# Patient Record
Sex: FEMALE | Race: WHITE | NOT HISPANIC OR LATINO | Employment: FULL TIME | URBAN - METROPOLITAN AREA
[De-identification: names, ages, dates, MRNs, and addresses within clinical notes are randomized per-mention and may not be internally consistent; named-entity substitution may affect disease eponyms.]

---

## 2017-03-01 ENCOUNTER — TRANSCRIBE ORDERS (OUTPATIENT)
Dept: OBSTETRICS AND GYNECOLOGY | Facility: CLINIC | Age: 32
End: 2017-03-01

## 2019-08-22 ENCOUNTER — HOSPITAL ENCOUNTER (EMERGENCY)
Facility: HOSPITAL | Age: 34
Discharge: HOME | End: 2019-08-22
Attending: EMERGENCY MEDICINE | Admitting: EMERGENCY MEDICINE

## 2019-08-22 ENCOUNTER — APPOINTMENT (OUTPATIENT)
Dept: GENERAL RADIOLOGY | Facility: HOSPITAL | Age: 34
End: 2019-08-22

## 2019-08-22 VITALS
SYSTOLIC BLOOD PRESSURE: 112 MMHG | TEMPERATURE: 98.3 F | DIASTOLIC BLOOD PRESSURE: 73 MMHG | RESPIRATION RATE: 16 BRPM | HEIGHT: 66 IN | HEART RATE: 65 BPM | WEIGHT: 150 LBS | BODY MASS INDEX: 24.11 KG/M2 | OXYGEN SATURATION: 97 %

## 2019-08-22 DIAGNOSIS — R09.1 PLEURISY: Primary | ICD-10-CM

## 2019-08-22 LAB
ALBUMIN SERPL-MCNC: 4.6 G/DL (ref 3.5–5.2)
ALBUMIN/GLOB SERPL: 1.8 G/DL
ALP SERPL-CCNC: 60 U/L (ref 39–117)
ALT SERPL W P-5'-P-CCNC: 18 U/L (ref 1–33)
ANION GAP SERPL CALCULATED.3IONS-SCNC: 14.2 MMOL/L (ref 5–15)
AST SERPL-CCNC: 17 U/L (ref 1–32)
BASOPHILS # BLD AUTO: 0.02 10*3/MM3 (ref 0–0.2)
BASOPHILS NFR BLD AUTO: 0.3 % (ref 0–1.5)
BILIRUB SERPL-MCNC: 0.3 MG/DL (ref 0.2–1.2)
BUN BLD-MCNC: 11 MG/DL (ref 6–20)
BUN/CREAT SERPL: 14.7 (ref 7–25)
CALCIUM SPEC-SCNC: 9.7 MG/DL (ref 8.6–10.5)
CHLORIDE SERPL-SCNC: 103 MMOL/L (ref 98–107)
CO2 SERPL-SCNC: 24.8 MMOL/L (ref 22–29)
CREAT BLD-MCNC: 0.75 MG/DL (ref 0.57–1)
D DIMER PPP FEU-MCNC: 0.28 MCGFEU/ML (ref 0–0.46)
DEPRECATED RDW RBC AUTO: 41.5 FL (ref 37–54)
EOSINOPHIL # BLD AUTO: 0.1 10*3/MM3 (ref 0–0.4)
EOSINOPHIL NFR BLD AUTO: 1.6 % (ref 0.3–6.2)
ERYTHROCYTE [DISTWIDTH] IN BLOOD BY AUTOMATED COUNT: 11.9 % (ref 12.3–15.4)
GFR SERPL CREATININE-BSD FRML MDRD: 88 ML/MIN/1.73
GLOBULIN UR ELPH-MCNC: 2.5 GM/DL
GLUCOSE BLD-MCNC: 126 MG/DL (ref 65–99)
HCG SERPL QL: NEGATIVE
HCT VFR BLD AUTO: 42.7 % (ref 34–46.6)
HGB BLD-MCNC: 14.9 G/DL (ref 12–15.9)
IMM GRANULOCYTES # BLD AUTO: 0.01 10*3/MM3 (ref 0–0.05)
IMM GRANULOCYTES NFR BLD AUTO: 0.2 % (ref 0–0.5)
LYMPHOCYTES # BLD AUTO: 1.6 10*3/MM3 (ref 0.7–3.1)
LYMPHOCYTES NFR BLD AUTO: 25.8 % (ref 19.6–45.3)
MCH RBC QN AUTO: 33 PG (ref 26.6–33)
MCHC RBC AUTO-ENTMCNC: 34.9 G/DL (ref 31.5–35.7)
MCV RBC AUTO: 94.7 FL (ref 79–97)
MONOCYTES # BLD AUTO: 0.44 10*3/MM3 (ref 0.1–0.9)
MONOCYTES NFR BLD AUTO: 7.1 % (ref 5–12)
NEUTROPHILS # BLD AUTO: 4.03 10*3/MM3 (ref 1.7–7)
NEUTROPHILS NFR BLD AUTO: 65 % (ref 42.7–76)
NRBC BLD AUTO-RTO: 0 /100 WBC (ref 0–0.2)
PLATELET # BLD AUTO: 219 10*3/MM3 (ref 140–450)
PMV BLD AUTO: 9.9 FL (ref 6–12)
POTASSIUM BLD-SCNC: 3.7 MMOL/L (ref 3.5–5.2)
PROT SERPL-MCNC: 7.1 G/DL (ref 6–8.5)
RBC # BLD AUTO: 4.51 10*6/MM3 (ref 3.77–5.28)
SODIUM BLD-SCNC: 142 MMOL/L (ref 136–145)
TROPONIN T SERPL-MCNC: <0.01 NG/ML (ref 0–0.03)
WBC NRBC COR # BLD: 6.2 10*3/MM3 (ref 3.4–10.8)

## 2019-08-22 PROCEDURE — 93005 ELECTROCARDIOGRAM TRACING: CPT | Performed by: EMERGENCY MEDICINE

## 2019-08-22 PROCEDURE — 85025 COMPLETE CBC W/AUTO DIFF WBC: CPT | Performed by: EMERGENCY MEDICINE

## 2019-08-22 PROCEDURE — 99284 EMERGENCY DEPT VISIT MOD MDM: CPT | Performed by: EMERGENCY MEDICINE

## 2019-08-22 PROCEDURE — 85379 FIBRIN DEGRADATION QUANT: CPT | Performed by: EMERGENCY MEDICINE

## 2019-08-22 PROCEDURE — 80053 COMPREHEN METABOLIC PANEL: CPT | Performed by: EMERGENCY MEDICINE

## 2019-08-22 PROCEDURE — 99284 EMERGENCY DEPT VISIT MOD MDM: CPT

## 2019-08-22 PROCEDURE — 84703 CHORIONIC GONADOTROPIN ASSAY: CPT | Performed by: EMERGENCY MEDICINE

## 2019-08-22 PROCEDURE — 25010000002 KETOROLAC TROMETHAMINE PER 15 MG: Performed by: EMERGENCY MEDICINE

## 2019-08-22 PROCEDURE — 96374 THER/PROPH/DIAG INJ IV PUSH: CPT

## 2019-08-22 PROCEDURE — 93010 ELECTROCARDIOGRAM REPORT: CPT | Performed by: INTERNAL MEDICINE

## 2019-08-22 PROCEDURE — 71046 X-RAY EXAM CHEST 2 VIEWS: CPT

## 2019-08-22 PROCEDURE — 84484 ASSAY OF TROPONIN QUANT: CPT | Performed by: EMERGENCY MEDICINE

## 2019-08-22 RX ORDER — SODIUM CHLORIDE 0.9 % (FLUSH) 0.9 %
10 SYRINGE (ML) INJECTION AS NEEDED
Status: DISCONTINUED | OUTPATIENT
Start: 2019-08-22 | End: 2019-08-22 | Stop reason: HOSPADM

## 2019-08-22 RX ORDER — DICLOFENAC SODIUM 75 MG/1
75 TABLET, DELAYED RELEASE ORAL 2 TIMES DAILY PRN
Qty: 20 TABLET | Refills: 0 | Status: SHIPPED | OUTPATIENT
Start: 2019-08-22 | End: 2019-12-17

## 2019-08-22 RX ORDER — KETOROLAC TROMETHAMINE 30 MG/ML
30 INJECTION, SOLUTION INTRAMUSCULAR; INTRAVENOUS ONCE
Status: COMPLETED | OUTPATIENT
Start: 2019-08-22 | End: 2019-08-22

## 2019-08-22 RX ADMIN — KETOROLAC TROMETHAMINE 30 MG: 30 INJECTION, SOLUTION INTRAMUSCULAR at 15:07

## 2019-08-22 NOTE — ED PROVIDER NOTES
Subjective     History provided by:  Patient    History of Present Illness    · Chief complaint: Chest pain    · Location: Substernally nonradiating    · Quality/Severity: Sharp fleeting intermittent episodes of chest pain.  Episodes last for seconds at a time and come and go.    · Timing/Onset: Started 2 days ago.    · Modifying Factors: She is unaware of any causative factors as the pain occurs at rest as well as with activity.  She was even awoken from sleep with it last night.  She does state that taking a deep breath exacerbates the pain.    · Associated symptoms: She denies associated shortness of breath, palpitations, or pain in her extremities or neck.    · Narrative: The patient is a 34-year-old white female complaining of a 2-day history of intermittent chest pain.  The pain is sharp and fleeting with episodes lasting a second at a time and coming and going.  Taking a deep breath exacerbates the pain.  Her past medical history is negative.  The patient works from home and smokes 1 pack/day.  Her last menstrual period was last week.  She is not on birth control.    Review of Systems   Constitutional: Negative for activity change, appetite change, chills, diaphoresis, fatigue and fever.   HENT: Negative for congestion, dental problem, ear pain, hearing loss, mouth sores, postnasal drip, rhinorrhea, sinus pressure, sore throat and voice change.    Eyes: Negative for photophobia, pain, discharge, redness and visual disturbance.   Respiratory: Negative for cough, chest tightness, shortness of breath, wheezing and stridor.    Cardiovascular: Positive for chest pain. Negative for palpitations and leg swelling.   Gastrointestinal: Negative for abdominal pain, diarrhea, nausea and vomiting.   Genitourinary: Negative for difficulty urinating, dysuria, flank pain, frequency, hematuria, pelvic pain, urgency and vaginal bleeding.   Musculoskeletal: Negative for arthralgias, back pain, gait problem, joint swelling,  "myalgias, neck pain and neck stiffness.   Skin: Negative for color change and rash.   Neurological: Negative for dizziness, tremors, seizures, syncope, facial asymmetry, speech difficulty, weakness, light-headedness, numbness and headaches.   Hematological: Negative for adenopathy.   Psychiatric/Behavioral: Negative.  Negative for confusion and decreased concentration. The patient is not nervous/anxious.      Past Medical History:   Diagnosis Date   • Chronic neck pain      /73   Pulse 65   Temp 98.3 °F (36.8 °C) (Oral)   Resp 16   Ht 167.6 cm (66\")   Wt 68 kg (150 lb)   LMP 08/17/2019 (Exact Date)   SpO2 97%   BMI 24.21 kg/m²     Past Medical History:   Diagnosis Date   • Chronic neck pain        Allergies   Allergen Reactions   • Sulfa Antibiotics        Past Surgical History:   Procedure Laterality Date   • CHOLECYSTECTOMY         Family History   Problem Relation Age of Onset   • Cancer Mother    • Diabetes Father    • No Known Problems Sister    • No Known Problems Brother    • No Known Problems Son    • No Known Problems Daughter    • No Known Problems Maternal Grandmother    • No Known Problems Maternal Grandfather    • No Known Problems Paternal Grandmother    • No Known Problems Paternal Grandfather    • No Known Problems Cousin        Social History     Socioeconomic History   • Marital status:      Spouse name: Not on file   • Number of children: Not on file   • Years of education: Not on file   • Highest education level: Not on file   Tobacco Use   • Smoking status: Current Every Day Smoker     Packs/day: 1.00   Substance and Sexual Activity   • Alcohol use: Yes     Comment: rare   • Drug use: No           Objective   Physical Exam   Constitutional: She is oriented to person, place, and time. She appears well-developed and well-nourished. No distress.   The patient appears healthy in no acute distress.  Review of her vital signs: She is afebrile and all her vital signs are within " normal limits.   HENT:   Head: Normocephalic and atraumatic.   Nose: Nose normal.   Mouth/Throat: Oropharynx is clear and moist. No oropharyngeal exudate.   Eyes: EOM are normal. Pupils are equal, round, and reactive to light. Right eye exhibits no discharge. Left eye exhibits no discharge. No scleral icterus.   Neck: Normal range of motion. Neck supple. No JVD present. No thyromegaly present.   Cardiovascular: Normal rate, regular rhythm and normal heart sounds.   No murmur heard.  Pulmonary/Chest: Effort normal and breath sounds normal. She has no wheezes. She has no rales. She exhibits no tenderness.   Abdominal: Soft. Bowel sounds are normal. She exhibits no distension. There is no tenderness.   Musculoskeletal: Normal range of motion. She exhibits no edema, tenderness or deformity.   Lymphadenopathy:     She has no cervical adenopathy.   Neurological: She is alert and oriented to person, place, and time. No cranial nerve deficit. Coordination normal.   No focal motor sensory deficit   Skin: Skin is warm and dry. Capillary refill takes less than 2 seconds. No rash noted. She is not diaphoretic.   Psychiatric: She has a normal mood and affect. Her behavior is normal. Judgment and thought content normal.   Nursing note and vitals reviewed.      Procedures           ED Course  ED Course as of Aug 22 1732   Thu Aug 22, 2019   1433 My interpretation the patient's EKG tracing performed at 13: 44 is normal sinus rhythm with a rate of 89, slight right axis deviation, no acute ST segment elevation or depression consistent with ischemia, no ectopy, normal TN and QT intervals, essentially a normal EKG.  [TP]   1600 Review of the patient's test results: Her chest x-ray was interpreted by me and the radiologist as normal.  Her CMP was essentially normal.  CBC normal.  Cardiac troponin normal.  Beta hCG negative.  D-dimer negative.  [TP]   1600 Is my impression the patient has noncardiac chest pain likely due to pleurisy.   She will be counseled to stop smoking.  I will place her on an anti-inflammatory medication such as Voltaren.  I counseled her to stop smoking.  She is to follow-up with her PCP in 1 week if not improved.  [TP]      ED Course User Index  [TP] Villa Bonner MD                  MDM  Number of Diagnoses or Management Options  Pleurisy: new and requires workup     Amount and/or Complexity of Data Reviewed  Clinical lab tests: ordered and reviewed  Tests in the radiology section of CPT®: ordered and reviewed  Tests in the medicine section of CPT®: ordered and reviewed  Independent visualization of images, tracings, or specimens: yes    Risk of Complications, Morbidity, and/or Mortality  Presenting problems: high  Diagnostic procedures: high  Management options: high  General comments: My differential diagnosis for chest pain includes but is not limited to:  Muscle strain, costochondritis, myositis, pleurisy, rib fracture, intercostal neuritis, herpes zoster, tumor, myocardial infarction, coronary syndrome, unstable angina, angina, aortic dissection, mitral valve prolapse, pericarditis, palpitations, pulmonary embolus, pneumonia, pneumothorax, lung cancer, GERD, esophagitis, esophageal spasm    Patient Progress  Patient progress: stable        Final diagnoses:   Pleurisy           Labs Reviewed   COMPREHENSIVE METABOLIC PANEL - Abnormal; Notable for the following components:       Result Value    Glucose 126 (*)     All other components within normal limits    Narrative:     GFR Normal >60  Chronic Kidney Disease <60  Kidney Failure <15   CBC WITH AUTO DIFFERENTIAL - Abnormal; Notable for the following components:    RDW 11.9 (*)     All other components within normal limits   HCG, SERUM, QUALITATIVE - Normal   TROPONIN (IN-HOUSE) - Normal    Narrative:     Troponin T Reference Range:  <= 0.03 ng/mL-   Negative for AMI  >0.03 ng/mL-     Abnormal for myocardial necrosis.  Clinicians would have to utilize clinical  acumen, EKG, Troponin and serial changes to determine if it is an Acute Myocardial Infarction or myocardial injury due to an underlying chronic condition.    D-DIMER, QUANTITATIVE - Normal    Narrative:     Can be elevated in, but is not diagnostic for deep vein thrombosis (DVT) or pulmonary embolis (PE).  It is also elevated in other medical conditions.  Clinical correlation is required.  The negative cut-off value for the D-Dimer is 0.50 mcg FEU/mL for DVT and PE.   CBC AND DIFFERENTIAL    Narrative:     The following orders were created for panel order CBC & Differential.  Procedure                               Abnormality         Status                     ---------                               -----------         ------                     CBC Auto Differential[[id number]]         Abnormal            Final result                 Please view results for these tests on the individual orders.     XR Chest 2 View   ED Interpretation   Negative chest.       This report was finalized on 8/22/2019 3:47 PM by Dr. Dudley Cervantes MD.          Final Result   Negative chest.       This report was finalized on 8/22/2019 3:47 PM by Dr. Dudley Cervantes MD.                 Medication List      New Prescriptions    diclofenac 75 MG EC tablet  Commonly known as:  VOLTAREN  Take 1 tablet by mouth 2 (Two) Times a Day As Needed (Pain) for up to 20   doses.               Villa Bonner MD  08/22/19 [id number]

## 2019-09-17 ENCOUNTER — OFFICE VISIT (OUTPATIENT)
Dept: OBSTETRICS AND GYNECOLOGY | Facility: CLINIC | Age: 34
End: 2019-09-17

## 2019-09-17 VITALS
BODY MASS INDEX: 23.95 KG/M2 | HEIGHT: 66 IN | DIASTOLIC BLOOD PRESSURE: 68 MMHG | WEIGHT: 149 LBS | SYSTOLIC BLOOD PRESSURE: 110 MMHG

## 2019-09-17 DIAGNOSIS — Z01.419 PAP SMEAR, LOW-RISK: ICD-10-CM

## 2019-09-17 DIAGNOSIS — Z13.9 SCREENING FOR CONDITION: ICD-10-CM

## 2019-09-17 DIAGNOSIS — Z01.419 ENCOUNTER FOR GYNECOLOGICAL EXAMINATION WITHOUT ABNORMAL FINDING: Primary | ICD-10-CM

## 2019-09-17 DIAGNOSIS — Z11.51 SPECIAL SCREENING EXAMINATION FOR HUMAN PAPILLOMAVIRUS (HPV): ICD-10-CM

## 2019-09-17 LAB
B-HCG UR QL: NEGATIVE
BILIRUB BLD-MCNC: NEGATIVE MG/DL
CLARITY, POC: CLEAR
COLOR UR: YELLOW
GLUCOSE UR STRIP-MCNC: NEGATIVE MG/DL
INTERNAL NEGATIVE CONTROL: NEGATIVE
INTERNAL POSITIVE CONTROL: POSITIVE
KETONES UR QL: NEGATIVE
LEUKOCYTE EST, POC: NEGATIVE
Lab: NORMAL
NITRITE UR-MCNC: NEGATIVE MG/ML
PH UR: 6 [PH] (ref 5–8)
PROT UR STRIP-MCNC: NEGATIVE MG/DL
RBC # UR STRIP: ABNORMAL /UL
SP GR UR: 1 (ref 1–1.03)
UROBILINOGEN UR QL: NORMAL

## 2019-09-17 PROCEDURE — 81025 URINE PREGNANCY TEST: CPT | Performed by: OBSTETRICS & GYNECOLOGY

## 2019-09-17 PROCEDURE — 99385 PREV VISIT NEW AGE 18-39: CPT | Performed by: OBSTETRICS & GYNECOLOGY

## 2019-09-17 PROCEDURE — 81002 URINALYSIS NONAUTO W/O SCOPE: CPT | Performed by: OBSTETRICS & GYNECOLOGY

## 2019-09-17 NOTE — PROGRESS NOTES
GYN Annual Exam     CC- Here for annual exam.     Namita Gabriel is a 34 y.o. female who presents for annual well woman exam. Periods are regular every 28-30 days, lasting 5 days. Dysmenorrhea:none. Cyclic symptoms include none. No intermenstrual bleeding, spotting, or discharge.    OB History      Para Term  AB Living    2 2 2     2    SAB TAB Ectopic Molar Multiple Live Births              2          Current contraception: none  History of abnormal Pap smear: no  Family history of uterine, colon or ovarian cancer: yes - Dad colon cancer  History of abnormal mammogram: no  Family history of breast cancer: yes - mom  Last Pap :     Past Medical History:   Diagnosis Date   • Chronic neck pain        Past Surgical History:   Procedure Laterality Date   • CHOLECYSTECTOMY           Current Outpatient Medications:   •  acetaminophen (TYLENOL) 500 MG tablet, Take 1,000 mg by mouth Every 6 (Six) Hours As Needed for mild pain (1-3) or moderate pain (4-6)., Disp: , Rfl:   •  diclofenac (VOLTAREN) 75 MG EC tablet, Take 1 tablet by mouth 2 (Two) Times a Day As Needed (Pain) for up to 20 doses., Disp: 20 tablet, Rfl: 0  •  ibuprofen (ADVIL,MOTRIN) 400 MG tablet, Take 400 mg by mouth Every 6 (Six) Hours As Needed for mild pain (1-3) or moderate pain (4-6)., Disp: , Rfl:   •  neomycin-polymyxin-dexamethasone (MAXITROL) 0.1 % ophthalmic suspension, Administer 1 drop to both eyes 4 (four) times a day., Disp: 5 mL, Rfl: 0  •  neomycin-polymyxin-hydrocortisone (CORTISPORIN) 3.5-79350-5 otic solution, Administer 3 drops into the left ear 4 (Four) Times a Day., Disp: 10 mL, Rfl: 0    Allergies   Allergen Reactions   • Sulfa Antibiotics        Social History     Tobacco Use   • Smoking status: Current Every Day Smoker     Packs/day: 1.00   • Smokeless tobacco: Never Used   Substance Use Topics   • Alcohol use: Yes     Comment: rare   • Drug use: No       Family History   Problem Relation Age of Onset   • Cancer Mother   "  • Diabetes Father    • Colon cancer Father    • No Known Problems Sister    • No Known Problems Brother    • No Known Problems Son    • No Known Problems Daughter    • No Known Problems Maternal Grandmother    • No Known Problems Maternal Grandfather    • No Known Problems Paternal Grandmother    • No Known Problems Paternal Grandfather    • No Known Problems Cousin        Review of Systems   Constitutional: Negative for appetite change, fever and unexpected weight change.   HENT: Negative for congestion and sore throat.    Respiratory: Negative for cough and shortness of breath.    Cardiovascular: Negative for chest pain and palpitations.   Gastrointestinal: Negative for abdominal distention, abdominal pain, constipation, diarrhea, nausea and vomiting.   Endocrine: Negative.    Genitourinary: Negative for dyspareunia, menstrual problem, pelvic pain and vaginal discharge.   Skin: Negative.    Neurological: Negative for dizziness and syncope.   Hematological: Negative.    Psychiatric/Behavioral: Negative for dysphoric mood and sleep disturbance. The patient is not nervous/anxious.        /68   Ht 167.6 cm (66\")   Wt 67.6 kg (149 lb)   LMP 09/11/2019   BMI 24.05 kg/m²     Physical Exam   Constitutional: She is oriented to person, place, and time. She appears well-developed and well-nourished.   HENT:   Head: Normocephalic and atraumatic.   Neck: Normal range of motion. Neck supple. No thyromegaly present.   Cardiovascular: Normal rate and regular rhythm.   Pulmonary/Chest: Effort normal and breath sounds normal. Right breast exhibits no mass and no nipple discharge. Left breast exhibits no mass and no nipple discharge. Breasts are symmetrical. There is no breast swelling.   Abdominal: Soft. Bowel sounds are normal. She exhibits no distension and no mass. There is no tenderness. There is no rebound and no guarding.   Genitourinary: Vagina normal and uterus normal. No breast tenderness, discharge or " bleeding. Pelvic exam was performed with patient prone. There is no lesion on the right labia. There is no lesion on the left labia. Cervix exhibits no motion tenderness and no discharge. Right adnexum displays no mass. Left adnexum displays no mass.   Musculoskeletal: Normal range of motion. She exhibits no edema.   Neurological: She is alert and oriented to person, place, and time.   Skin: Skin is warm and dry.   Psychiatric: She has a normal mood and affect. Her behavior is normal. Judgment and thought content normal.   Nursing note and vitals reviewed.      Diagnoses and all orders for this visit:    Pap smear, low-risk  -     Pap IG, HPV-hr    Screening for condition  -     POC Urinalysis Dipstick  -     POC Pregnancy, Urine    Special screening examination for human papillomavirus (HPV)  -     Pap IG, HPV-hr        Assessment     1) GYN annual well woman exam.   2) MMG - Hx of mom  with breast cancer at 50     Plan     1) Breast Health - Clinical breast exam & mammogram yearly, Self breast awareness monthly  2) Pap - done today  3) Smoking status- Current every day smoker 3-10 mintues spent counseling Will try to cut down  4) Colon health - screening colonoscopy recommended if not up to date  5) Bone health - Weight bearing exercise, dietary calcium recommendations and vitamin D reviewed.   6) Seat belts recommended  7) Follow up prn and one year    Encounter Diagnoses   Name Primary?   • Pap smear, low-risk Yes   • Screening for condition    • Special screening examination for human papillomavirus (HPV)          Juan Kimbrough MD  2019  11:01 AM

## 2019-09-20 LAB
CYTOLOGIST CVX/VAG CYTO: ABNORMAL
CYTOLOGY CVX/VAG DOC CYTO: ABNORMAL
CYTOLOGY CVX/VAG DOC THIN PREP: ABNORMAL
DX ICD CODE: ABNORMAL
DX ICD CODE: ABNORMAL
HIV 1 & 2 AB SER-IMP: ABNORMAL
HPV I/H RISK 1 DNA CVX QL PROBE+SIG AMP: NEGATIVE
OTHER STN SPEC: ABNORMAL
PATHOLOGIST CVX/VAG CYTO: ABNORMAL
RECOM F/U CVX/VAG CYTO: ABNORMAL
STAT OF ADQ CVX/VAG CYTO-IMP: ABNORMAL

## 2019-09-25 ENCOUNTER — HOSPITAL ENCOUNTER (OUTPATIENT)
Dept: MAMMOGRAPHY | Facility: HOSPITAL | Age: 34
Discharge: HOME | End: 2019-09-25
Admitting: OBSTETRICS & GYNECOLOGY

## 2019-09-25 DIAGNOSIS — Z13.9 SCREENING FOR CONDITION: ICD-10-CM

## 2019-09-25 PROCEDURE — 77067 SCR MAMMO BI INCL CAD: CPT

## 2019-09-25 PROCEDURE — 77063 BREAST TOMOSYNTHESIS BI: CPT

## 2019-09-26 ENCOUNTER — TELEPHONE (OUTPATIENT)
Dept: OBSTETRICS AND GYNECOLOGY | Facility: CLINIC | Age: 34
End: 2019-09-26

## 2019-09-30 ENCOUNTER — TRANSCRIBE ORDERS (OUTPATIENT)
Dept: ADMINISTRATIVE | Facility: HOSPITAL | Age: 34
End: 2019-09-30

## 2019-09-30 DIAGNOSIS — M54.2 CERVICALGIA: Primary | ICD-10-CM

## 2019-10-02 NOTE — TELEPHONE ENCOUNTER
Patient aware and states she will go ahead and schedule and check with insurance company. But will have to call back to schedule because she did not have her calendar with her.

## 2019-10-04 ENCOUNTER — HOSPITAL ENCOUNTER (OUTPATIENT)
Dept: MRI IMAGING | Facility: HOSPITAL | Age: 34
Discharge: HOME | End: 2019-10-04

## 2019-10-04 DIAGNOSIS — M54.2 CERVICALGIA: ICD-10-CM

## 2019-12-17 ENCOUNTER — OFFICE VISIT (OUTPATIENT)
Dept: OBSTETRICS AND GYNECOLOGY | Facility: CLINIC | Age: 34
End: 2019-12-17

## 2019-12-17 VITALS
HEIGHT: 66 IN | SYSTOLIC BLOOD PRESSURE: 110 MMHG | BODY MASS INDEX: 24.3 KG/M2 | DIASTOLIC BLOOD PRESSURE: 70 MMHG | WEIGHT: 151.2 LBS

## 2019-12-17 DIAGNOSIS — N93.8 DUB (DYSFUNCTIONAL UTERINE BLEEDING): Primary | ICD-10-CM

## 2019-12-17 DIAGNOSIS — Z13.9 SCREENING FOR CONDITION: ICD-10-CM

## 2019-12-17 LAB
B-HCG UR QL: NEGATIVE
BASOPHILS # BLD AUTO: 0.06 10*3/MM3 (ref 0–0.2)
BASOPHILS NFR BLD AUTO: 0.8 % (ref 0–1.5)
BILIRUB BLD-MCNC: NEGATIVE MG/DL
CLARITY, POC: CLEAR
COLOR UR: YELLOW
EOSINOPHIL # BLD AUTO: 0.22 10*3/MM3 (ref 0–0.4)
EOSINOPHIL NFR BLD AUTO: 3 % (ref 0.3–6.2)
ERYTHROCYTE [DISTWIDTH] IN BLOOD BY AUTOMATED COUNT: 12.2 % (ref 12.3–15.4)
GLUCOSE UR STRIP-MCNC: NEGATIVE MG/DL
HCT VFR BLD AUTO: 44.3 % (ref 34–46.6)
HGB BLD-MCNC: 15.2 G/DL (ref 12–15.9)
IMM GRANULOCYTES # BLD AUTO: 0.02 10*3/MM3 (ref 0–0.05)
IMM GRANULOCYTES NFR BLD AUTO: 0.3 % (ref 0–0.5)
INTERNAL NEGATIVE CONTROL: NEGATIVE
INTERNAL POSITIVE CONTROL: POSITIVE
KETONES UR QL: NEGATIVE
LEUKOCYTE EST, POC: NEGATIVE
LYMPHOCYTES # BLD AUTO: 1.8 10*3/MM3 (ref 0.7–3.1)
LYMPHOCYTES NFR BLD AUTO: 24.9 % (ref 19.6–45.3)
Lab: 55
MCH RBC QN AUTO: 32.6 PG (ref 26.6–33)
MCHC RBC AUTO-ENTMCNC: 34.3 G/DL (ref 31.5–35.7)
MCV RBC AUTO: 95.1 FL (ref 79–97)
MONOCYTES # BLD AUTO: 0.53 10*3/MM3 (ref 0.1–0.9)
MONOCYTES NFR BLD AUTO: 7.3 % (ref 5–12)
NEUTROPHILS # BLD AUTO: 4.61 10*3/MM3 (ref 1.7–7)
NEUTROPHILS NFR BLD AUTO: 63.7 % (ref 42.7–76)
NITRITE UR-MCNC: NEGATIVE MG/ML
NRBC BLD AUTO-RTO: 0 /100 WBC (ref 0–0.2)
PH UR: 5 [PH] (ref 5–8)
PLATELET # BLD AUTO: 324 10*3/MM3 (ref 140–450)
PROT UR STRIP-MCNC: NEGATIVE MG/DL
RBC # BLD AUTO: 4.66 10*6/MM3 (ref 3.77–5.28)
RBC # UR STRIP: ABNORMAL /UL
SP GR UR: 1 (ref 1–1.03)
TSH SERPL DL<=0.005 MIU/L-ACNC: 1.54 UIU/ML (ref 0.27–4.2)
UROBILINOGEN UR QL: NORMAL
WBC # BLD AUTO: 7.24 10*3/MM3 (ref 3.4–10.8)

## 2019-12-17 PROCEDURE — 81002 URINALYSIS NONAUTO W/O SCOPE: CPT | Performed by: OBSTETRICS & GYNECOLOGY

## 2019-12-17 PROCEDURE — 99213 OFFICE O/P EST LOW 20 MIN: CPT | Performed by: OBSTETRICS & GYNECOLOGY

## 2019-12-17 PROCEDURE — 81025 URINE PREGNANCY TEST: CPT | Performed by: OBSTETRICS & GYNECOLOGY

## 2019-12-17 RX ORDER — HYDROCODONE BITARTRATE AND ACETAMINOPHEN 5; 325 MG/1; MG/1
1 TABLET ORAL
COMMUNITY
Start: 2019-11-22 | End: 2020-08-04

## 2019-12-17 RX ORDER — CYCLOBENZAPRINE HCL 10 MG
TABLET ORAL
Refills: 1 | COMMUNITY
Start: 2019-10-22 | End: 2020-08-04

## 2019-12-17 NOTE — PROGRESS NOTES
"      Namita Gabriel is a 34 y.o. patient who presents for follow up of   Chief Complaint   Patient presents with   • Follow-up     spotting for 7 days, not period       HPI 34-year-old multiparous patient with a new problem of dysfunctional uterine bleeding.  She had a normal cycle at the beginning of the month and then had 7 days of spotting.  She has to wear panty liner daily.  It is associated with a minimal amount of discharge.  She denies any fevers or pelvic pain.    The following portions of the patient's history were reviewed and updated as appropriate: allergies, current medications and problem list.    Review of Systems   Constitutional: Negative for appetite change, fever and unexpected weight change.   HENT: Negative for congestion and sore throat.    Respiratory: Negative for cough and shortness of breath.    Cardiovascular: Negative for chest pain and palpitations.   Gastrointestinal: Negative for abdominal distention, abdominal pain, constipation, diarrhea, nausea and vomiting.   Endocrine: Negative.    Genitourinary: Positive for menstrual problem, vaginal bleeding and vaginal discharge (scant). Negative for dyspareunia and pelvic pain.   Skin: Negative.    Neurological: Negative for dizziness and syncope.   Hematological: Negative.    Psychiatric/Behavioral: Negative for dysphoric mood and sleep disturbance. The patient is not nervous/anxious.        /70   Ht 167.6 cm (65.98\")   Wt 68.6 kg (151 lb 3.2 oz)   LMP 12/01/2019 (Approximate)   Breastfeeding No   BMI 24.42 kg/m²     Physical Exam   Constitutional: She is oriented to person, place, and time. She appears well-developed and well-nourished.   HENT:   Head: Normocephalic and atraumatic.   Pulmonary/Chest: Effort normal. No respiratory distress.   Abdominal: Soft. She exhibits no distension and no mass. There is no tenderness. There is no rebound and no guarding.   Musculoskeletal: Normal range of motion.   Neurological: She is alert " and oriented to person, place, and time.   Skin: Skin is warm and dry.   Psychiatric: She has a normal mood and affect. Her behavior is normal. Judgment and thought content normal.   Nursing note and vitals reviewed.        Assessment/Plan    Namita was seen today for follow-up.    Diagnoses and all orders for this visit:    DUB (dysfunctional uterine bleeding)  -     CBC & Differential  -     TSH Rfx On Abnormal To Free T4  -     Chlamydia trachomatis, Neisseria gonorrhoeae, Trichomonas vaginalis, PCR - Urine, Urine, Clean Catch    Screening for condition  -     POC Urinalysis Dipstick  -     POC Pregnancy, Urine    Check TSH and CBC.  Rule out cervicitis.  Check pelvic ultrasound.  Follow-up after testing.    Return for US, TV, Follow up with me.      Juan Kimbrough MD  12/17/2019  9:50 AM

## 2019-12-19 LAB
C TRACH RRNA SPEC QL NAA+PROBE: NEGATIVE
N GONORRHOEA RRNA SPEC QL NAA+PROBE: NEGATIVE
T VAGINALIS DNA SPEC QL NAA+PROBE: NEGATIVE

## 2020-01-02 ENCOUNTER — PROCEDURE VISIT (OUTPATIENT)
Dept: OBSTETRICS AND GYNECOLOGY | Facility: CLINIC | Age: 35
End: 2020-01-02

## 2020-01-02 ENCOUNTER — OFFICE VISIT (OUTPATIENT)
Dept: OBSTETRICS AND GYNECOLOGY | Facility: CLINIC | Age: 35
End: 2020-01-02

## 2020-01-02 VITALS
HEIGHT: 66 IN | WEIGHT: 154 LBS | DIASTOLIC BLOOD PRESSURE: 70 MMHG | BODY MASS INDEX: 24.75 KG/M2 | SYSTOLIC BLOOD PRESSURE: 110 MMHG

## 2020-01-02 DIAGNOSIS — N93.8 DUB (DYSFUNCTIONAL UTERINE BLEEDING): Primary | ICD-10-CM

## 2020-01-02 DIAGNOSIS — N83.201 CYST OF RIGHT OVARY: ICD-10-CM

## 2020-01-02 PROCEDURE — 99213 OFFICE O/P EST LOW 20 MIN: CPT | Performed by: OBSTETRICS & GYNECOLOGY

## 2020-01-02 PROCEDURE — 76830 TRANSVAGINAL US NON-OB: CPT | Performed by: OBSTETRICS & GYNECOLOGY

## 2020-01-02 RX ORDER — AMOXICILLIN 500 MG/1
CAPSULE ORAL
COMMUNITY
Start: 2019-12-26 | End: 2020-08-04

## 2020-01-02 NOTE — PROGRESS NOTES
"      Namita Gabriel is a 34 y.o. patient who presents for follow up of   Chief Complaint   Patient presents with   • Follow-up       HPI 34-year-old female with an episode of dysfunctional uterine bleeding.  It stopped a few days after I saw her last.  She had labs drawn and it pelvic ultrasound today.  Her symptoms have not returned.    The following portions of the patient's history were reviewed and updated as appropriate: allergies, current medications and problem list.    Review of Systems   Constitutional: Negative for appetite change, fever and unexpected weight change.   HENT: Negative for congestion and sore throat.    Respiratory: Negative for cough and shortness of breath.    Cardiovascular: Negative for chest pain and palpitations.   Gastrointestinal: Negative for abdominal distention, abdominal pain, constipation, diarrhea, nausea and vomiting.   Endocrine: Negative.    Genitourinary: Negative for dyspareunia, menstrual problem, pelvic pain and vaginal discharge.   Skin: Negative.    Neurological: Negative for dizziness and syncope.   Hematological: Negative.    Psychiatric/Behavioral: Negative for dysphoric mood and sleep disturbance. The patient is not nervous/anxious.        /70   Ht 167.6 cm (65.98\")   Wt 69.9 kg (154 lb)   LMP 12/01/2019 (Exact Date)   Breastfeeding No   BMI 24.87 kg/m²     Physical Exam   Constitutional: She is oriented to person, place, and time. She appears well-developed and well-nourished.   HENT:   Head: Normocephalic and atraumatic.   Pulmonary/Chest: Effort normal. No respiratory distress.   Abdominal: Soft. She exhibits no distension and no mass. There is no tenderness. There is no rebound and no guarding.   Musculoskeletal: Normal range of motion.   Neurological: She is alert and oriented to person, place, and time.   Skin: Skin is warm and dry.   Psychiatric: She has a normal mood and affect. Her behavior is normal. Judgment and thought content normal. "   Nursing note and vitals reviewed.    TSH and CBC normal.  Infection studies negative.  Reviewed ultrasound with the patient.  Uterus is anteverted with smooth borders and contours.  Endometrial thickness is normal for a premenopausal female.  Follicular cyst noted.  Small amount of fluid in the cul-de-sac.  No acute pathology.    Assessment/Plan    Namita was seen today for follow-up.    Diagnoses and all orders for this visit:    DUB (dysfunctional uterine bleeding)    Labs and ultrasound essentially normal.  Symptoms have resolved.  Follow-up as needed.    Return if symptoms worsen or fail to improve.      Juan Kimbrough MD  1/2/2020  1:38 PM

## 2020-08-04 ENCOUNTER — OFFICE VISIT (OUTPATIENT)
Dept: OBSTETRICS AND GYNECOLOGY | Facility: CLINIC | Age: 35
End: 2020-08-04

## 2020-08-04 VITALS
DIASTOLIC BLOOD PRESSURE: 60 MMHG | BODY MASS INDEX: 27.49 KG/M2 | HEIGHT: 65 IN | SYSTOLIC BLOOD PRESSURE: 110 MMHG | WEIGHT: 165 LBS

## 2020-08-04 DIAGNOSIS — Z01.419 ROUTINE GYNECOLOGICAL EXAMINATION: Primary | ICD-10-CM

## 2020-08-04 DIAGNOSIS — Z11.51 SPECIAL SCREENING EXAMINATION FOR HUMAN PAPILLOMAVIRUS (HPV): ICD-10-CM

## 2020-08-04 DIAGNOSIS — N63.0 BREAST MASS IN FEMALE: ICD-10-CM

## 2020-08-04 DIAGNOSIS — Z01.419 PAP SMEAR, LOW-RISK: ICD-10-CM

## 2020-08-04 LAB
B-HCG UR QL: NEGATIVE
BILIRUB BLD-MCNC: NEGATIVE MG/DL
CLARITY, POC: CLEAR
COLOR UR: YELLOW
GLUCOSE UR STRIP-MCNC: NEGATIVE MG/DL
INTERNAL NEGATIVE CONTROL: NEGATIVE
INTERNAL POSITIVE CONTROL: POSITIVE
KETONES UR QL: NEGATIVE
LEUKOCYTE EST, POC: NEGATIVE
Lab: NORMAL
NITRITE UR-MCNC: NEGATIVE MG/ML
PH UR: 5 [PH] (ref 5–8)
PROT UR STRIP-MCNC: NEGATIVE MG/DL
RBC # UR STRIP: NEGATIVE /UL
SP GR UR: 1 (ref 1–1.03)
UROBILINOGEN UR QL: NORMAL

## 2020-08-04 PROCEDURE — 99395 PREV VISIT EST AGE 18-39: CPT | Performed by: OBSTETRICS & GYNECOLOGY

## 2020-08-04 PROCEDURE — 81025 URINE PREGNANCY TEST: CPT | Performed by: OBSTETRICS & GYNECOLOGY

## 2020-08-04 PROCEDURE — 81002 URINALYSIS NONAUTO W/O SCOPE: CPT | Performed by: OBSTETRICS & GYNECOLOGY

## 2020-08-04 NOTE — PROGRESS NOTES
GYN Annual Exam     CC- Here for annual exam.     Namita Gabriel is a 35 y.o. female who presents for annual well woman exam. Periods are regular every 28-30 days, lasting 5 days. Dysmenorrhea:none. Cyclic symptoms include none. No intermenstrual bleeding, spotting, or discharge.  Patient had a episode of 4 days of severe left breast pain in the outer quadrant with a small amount of white nipple discharge.  The pain was quite severe for 4 days but is gone currently.  She had a mammogram last year because she has a significant history of her young mother being diagnosed with breast cancer as well.  This is a new problem for her.    OB History        2    Para   2    Term   2            AB        Living   2       SAB        TAB        Ectopic        Molar        Multiple        Live Births   2                Current contraception: none  History of abnormal Pap smear: yes - ASCUS negative HPV last year  Family history of uterine, colon or ovarian cancer: no  History of abnormal mammogram: no  Family history of breast cancer: yes - mother, young  Last Pap : 2019    Past Medical History:   Diagnosis Date   • Chronic neck pain        Past Surgical History:   Procedure Laterality Date   • CHOLECYSTECTOMY         No current outpatient medications on file.    Allergies   Allergen Reactions   • Sulfa Antibiotics Hives       Social History     Tobacco Use   • Smoking status: Current Every Day Smoker     Packs/day: 1.00   • Smokeless tobacco: Never Used   Substance Use Topics   • Alcohol use: Yes     Comment: rare   • Drug use: No         Family History   Problem Relation Age of Onset   • Cancer Mother    • Breast cancer Mother    • Diabetes Father    • Colon cancer Father    • No Known Problems Sister    • No Known Problems Brother    • No Known Problems Son    • No Known Problems Daughter    • No Known Problems Maternal Grandmother    • No Known Problems Maternal Grandfather    • No Known Problems Paternal  "Grandmother    • No Known Problems Paternal Grandfather    • No Known Problems Cousin        Review of Systems   Constitutional: Negative for appetite change, fever and unexpected weight change.   HENT: Negative for congestion and sore throat.    Respiratory: Negative for cough and shortness of breath.    Cardiovascular: Negative for chest pain and palpitations.   Gastrointestinal: Negative for abdominal distention, abdominal pain, constipation, diarrhea, nausea and vomiting.   Endocrine: Negative.    Genitourinary: Negative for dyspareunia, menstrual problem, pelvic pain and vaginal discharge.   Skin: Negative.    Neurological: Negative for dizziness and syncope.   Hematological: Negative.    Psychiatric/Behavioral: Negative for dysphoric mood and sleep disturbance. The patient is not nervous/anxious.        /60   Ht 165.1 cm (65\")   Wt 74.8 kg (165 lb)   LMP 07/29/2020   Breastfeeding No   BMI 27.46 kg/m²     Physical Exam   Constitutional: She is oriented to person, place, and time. She appears well-developed and well-nourished.   HENT:   Head: Normocephalic and atraumatic.   Neck: Normal range of motion. Neck supple. No thyromegaly present.   Cardiovascular: Normal rate and regular rhythm.   Pulmonary/Chest: Effort normal and breath sounds normal. Right breast exhibits no mass and no nipple discharge. Left breast exhibits no mass and no nipple discharge. No breast swelling, tenderness, discharge or bleeding. Breasts are symmetrical.       Abdominal: Soft. Bowel sounds are normal. She exhibits no distension and no mass. There is no tenderness. There is no rebound and no guarding.   Genitourinary: Vagina normal and uterus normal.       No breast swelling, tenderness, discharge or bleeding. Pelvic exam was performed with patient prone. There is no lesion on the right labia. There is lesion on the left labia. Cervix exhibits no motion tenderness and no discharge. Right adnexum displays no mass. Left " adnexum displays no mass.   Musculoskeletal: Normal range of motion. She exhibits no edema.   Neurological: She is alert and oriented to person, place, and time.   Skin: Skin is warm and dry.   Psychiatric: She has a normal mood and affect. Her behavior is normal. Judgment and thought content normal.   Nursing note and vitals reviewed.      Diagnoses and all orders for this visit:    Routine gynecological examination  -     POC Urinalysis Dipstick  -     POC Pregnancy, Urine  -     Pap IG, HPV-hr    Pap smear, low-risk  -     POC Urinalysis Dipstick  -     POC Pregnancy, Urine  -     Pap IG, HPV-hr    Special screening examination for human papillomavirus (HPV)  -     POC Urinalysis Dipstick  -     POC Pregnancy, Urine  -     Pap IG, HPV-hr    Breast mass in female  -     Mammo Diagnostic Digital Tomosynthesis Bilateral With CAD; Future  -     US Breast Left Complete; Future        Assessment     1) GYN annual well woman exam.   2) left breast mass -diagnostic mammogram with ultrasound if indicated.     Plan     1) Breast Health - Clinical breast exam & mammogram yearly, Self breast awareness monthly  2) Pap - done today  3) Smoking status- Namita Gabriel  reports that she has been smoking. She has been smoking about 1.00 pack per day. She has never used smokeless tobacco.. I have educated her on the risk of diseases from using tobacco products such as cancer, COPD and heart diease.   4) Colon health - screening colonoscopy recommended if not up to date  5) Bone health - Weight bearing exercise, dietary calcium recommendations and vitamin D reviewed.   6) Seat belts recommended  7) Follow up prn and one year    Encounter Diagnoses   Name Primary?   • Routine gynecological examination Yes   • Pap smear, low-risk    • Special screening examination for human papillomavirus (HPV)    • Breast mass in female          Juan Kimbrough MD  8/4/2020  14:45

## 2020-08-07 LAB
CYTOLOGIST CVX/VAG CYTO: NORMAL
CYTOLOGY CVX/VAG DOC CYTO: NORMAL
CYTOLOGY CVX/VAG DOC THIN PREP: NORMAL
DX ICD CODE: NORMAL
HIV 1 & 2 AB SER-IMP: NORMAL
HPV I/H RISK 1 DNA CVX QL PROBE+SIG AMP: NEGATIVE
Lab: NORMAL
OTHER STN SPEC: NORMAL
STAT OF ADQ CVX/VAG CYTO-IMP: NORMAL

## 2020-08-27 ENCOUNTER — HOSPITAL ENCOUNTER (OUTPATIENT)
Dept: ULTRASOUND IMAGING | Facility: HOSPITAL | Age: 35
Discharge: HOME | End: 2020-08-27

## 2020-08-27 ENCOUNTER — HOSPITAL ENCOUNTER (OUTPATIENT)
Dept: MAMMOGRAPHY | Facility: HOSPITAL | Age: 35
Discharge: HOME | End: 2020-08-27
Admitting: OBSTETRICS & GYNECOLOGY

## 2020-08-27 DIAGNOSIS — N63.0 BREAST MASS IN FEMALE: ICD-10-CM

## 2020-08-27 PROCEDURE — 76642 ULTRASOUND BREAST LIMITED: CPT

## 2020-08-27 PROCEDURE — 77066 DX MAMMO INCL CAD BI: CPT

## 2020-08-27 PROCEDURE — G0279 TOMOSYNTHESIS, MAMMO: HCPCS

## 2020-09-23 ENCOUNTER — OFFICE VISIT (OUTPATIENT)
Dept: OBSTETRICS AND GYNECOLOGY | Facility: CLINIC | Age: 35
End: 2020-09-23

## 2020-09-23 VITALS
WEIGHT: 171.1 LBS | BODY MASS INDEX: 28.51 KG/M2 | DIASTOLIC BLOOD PRESSURE: 88 MMHG | SYSTOLIC BLOOD PRESSURE: 118 MMHG | HEIGHT: 65 IN

## 2020-09-23 DIAGNOSIS — N63.0 BREAST MASS IN FEMALE: Primary | ICD-10-CM

## 2020-09-23 PROCEDURE — 99213 OFFICE O/P EST LOW 20 MIN: CPT | Performed by: OBSTETRICS & GYNECOLOGY

## 2020-09-24 NOTE — PROGRESS NOTES
"      Namita Gabriel is a 35 y.o. patient who presents for follow up of   Chief Complaint   Patient presents with   • Follow-up     Breast MRI       HPI patient here to follow-up mammogram and ultrasound.  Breast pain is completely resolved.  She is unable to palpate the mass that was previously present.    The following portions of the patient's history were reviewed and updated as appropriate: allergies, current medications and problem list.    Review of Systems   Constitutional: Negative for appetite change, fever and unexpected weight change.   HENT: Negative for congestion and sore throat.    Respiratory: Negative for cough and shortness of breath.    Cardiovascular: Negative for chest pain and palpitations.   Gastrointestinal: Negative for abdominal distention, abdominal pain, constipation, diarrhea, nausea and vomiting.   Endocrine: Negative.    Genitourinary: Negative for dyspareunia, menstrual problem, pelvic pain and vaginal discharge.   Skin: Negative.    Neurological: Negative for dizziness and syncope.   Hematological: Negative.    Psychiatric/Behavioral: Negative for dysphoric mood and sleep disturbance. The patient is not nervous/anxious.        /88   Ht 165.1 cm (65\")   Wt 77.6 kg (171 lb 1.6 oz)   LMP 09/15/2020   Breastfeeding No   BMI 28.47 kg/m²     Physical Exam  Vitals signs and nursing note reviewed.   Constitutional:       Appearance: She is well-developed.   HENT:      Head: Normocephalic and atraumatic.   Pulmonary:      Effort: Pulmonary effort is normal. No respiratory distress.   Abdominal:      General: There is no distension.      Palpations: Abdomen is soft. There is no mass.      Tenderness: There is no abdominal tenderness. There is no guarding or rebound.   Musculoskeletal: Normal range of motion.   Skin:     General: Skin is warm and dry.   Neurological:      Mental Status: She is alert and oriented to person, place, and time.   Psychiatric:         Behavior: Behavior " normal.         Thought Content: Thought content normal.         Judgment: Judgment normal.     We reviewed the breast mammogram and ultrasound.      Assessment/Plan    Namita was seen today for follow-up.    Diagnoses and all orders for this visit:    Breast mass in female    We both came to the same conclusion that MRI may be preferred and there are young age given her family history.  Recommend referral to breast surgeon for consult to see if that is indicated.    Return if symptoms worsen or fail to improve.      Juan Kimbrough MD  9/24/2020  15:12 EDT

## 2020-09-28 ENCOUNTER — TELEPHONE (OUTPATIENT)
Dept: SURGERY | Facility: CLINIC | Age: 35
End: 2020-09-28

## 2020-09-28 NOTE — TELEPHONE ENCOUNTER
New patient appointment with Coreen Greenwood NP is scheduled on 2/2/2021 @ 9:30am.    Called and left message with patient about appointment time, patient to call back and confirm.    Sent patient a reminder letter in the mail.

## 2021-02-02 ENCOUNTER — TELEPHONE (OUTPATIENT)
Dept: SURGERY | Facility: CLINIC | Age: 36
End: 2021-02-02

## 2021-02-02 ENCOUNTER — OFFICE VISIT (OUTPATIENT)
Dept: SURGERY | Facility: CLINIC | Age: 36
End: 2021-02-02

## 2021-02-02 VITALS
OXYGEN SATURATION: 98 % | HEIGHT: 65 IN | HEART RATE: 75 BPM | RESPIRATION RATE: 17 BRPM | BODY MASS INDEX: 29.99 KG/M2 | SYSTOLIC BLOOD PRESSURE: 120 MMHG | TEMPERATURE: 98.4 F | DIASTOLIC BLOOD PRESSURE: 78 MMHG | WEIGHT: 180 LBS

## 2021-02-02 DIAGNOSIS — N60.12 FIBROCYSTIC BREAST CHANGES, BILATERAL: ICD-10-CM

## 2021-02-02 DIAGNOSIS — N60.11 FIBROCYSTIC BREAST CHANGES, BILATERAL: ICD-10-CM

## 2021-02-02 DIAGNOSIS — Z80.3 FH: BREAST CANCER: ICD-10-CM

## 2021-02-02 DIAGNOSIS — F17.200 SMOKING: ICD-10-CM

## 2021-02-02 DIAGNOSIS — R92.8 ABNORMAL MAMMOGRAM: Primary | ICD-10-CM

## 2021-02-02 DIAGNOSIS — R63.8 INCREASED BODY MASS INDEX (BMI): ICD-10-CM

## 2021-02-02 DIAGNOSIS — Z91.89 INCREASED RISK OF BREAST CANCER: ICD-10-CM

## 2021-02-02 DIAGNOSIS — R92.2 BREAST DENSITY: ICD-10-CM

## 2021-02-02 PROBLEM — R92.30 BREAST DENSITY: Status: ACTIVE | Noted: 2021-02-02

## 2021-02-02 PROBLEM — IMO0001 SMOKING: Status: ACTIVE | Noted: 2021-02-02

## 2021-02-02 PROCEDURE — 99204 OFFICE O/P NEW MOD 45 MIN: CPT | Performed by: NURSE PRACTITIONER

## 2021-02-02 RX ORDER — CYCLOBENZAPRINE HCL 10 MG
10 TABLET ORAL 2 TIMES DAILY PRN
COMMUNITY
End: 2021-09-21

## 2021-02-02 NOTE — TELEPHONE ENCOUNTER
Valium 10 mg po 1 hr pre MRI Breast  Dis #1, no refills   Called into patient pharmacy and confirmed with patient.     [facility] - [place], [place] - [address] [place] [address] - [phone]  - [phone]  [phone] (Phone)

## 2021-02-03 ENCOUNTER — TELEPHONE (OUTPATIENT)
Dept: SURGERY | Facility: CLINIC | Age: 36
End: 2021-02-03

## 2021-02-03 NOTE — TELEPHONE ENCOUNTER
Breast MRI is scheduled on 2/23/2021 @ 8:45am, patient arrival 8:15am.    MMG & US are scheduled @ [facility] on 3/2/2021 @ 1:00pm.    F/u appointment with Coreen Greenwood NP is scheduled on 8/4/2021 @ 9:30am.    Called and spoke to patient, patient expressed v/u of appointment times.    Sent patient a reminder letter in the mail.

## 2021-02-23 ENCOUNTER — APPOINTMENT (OUTPATIENT)
Dept: MRI IMAGING | Facility: HOSPITAL | Age: 36
End: 2021-02-23

## 2021-02-23 ENCOUNTER — HOSPITAL ENCOUNTER (OUTPATIENT)
Dept: MRI IMAGING | Facility: HOSPITAL | Age: 36
Discharge: HOME | End: 2021-02-23
Admitting: NURSE PRACTITIONER

## 2021-02-23 DIAGNOSIS — Z80.3 FH: BREAST CANCER: ICD-10-CM

## 2021-02-23 DIAGNOSIS — Z91.89 INCREASED RISK OF BREAST CANCER: ICD-10-CM

## 2021-02-23 DIAGNOSIS — R92.8 ABNORMAL MAMMOGRAM: ICD-10-CM

## 2021-02-23 PROCEDURE — 77049 MRI BREAST C-+ W/CAD BI: CPT

## 2021-02-23 PROCEDURE — A9577 INJ MULTIHANCE: HCPCS | Performed by: NURSE PRACTITIONER

## 2021-02-23 PROCEDURE — 0 GADOBENATE DIMEGLUMINE 529 MG/ML SOLUTION: Performed by: NURSE PRACTITIONER

## 2021-02-23 RX ADMIN — GADOBENATE DIMEGLUMINE 15 ML: 529 INJECTION, SOLUTION INTRAVENOUS at 11:32

## 2021-02-24 ENCOUNTER — TELEPHONE (OUTPATIENT)
Dept: SURGERY | Facility: CLINIC | Age: 36
End: 2021-02-24

## 2021-02-24 DIAGNOSIS — R92.8 ABNORMAL FINDING ON BREAST IMAGING: Primary | ICD-10-CM

## 2021-02-24 NOTE — TELEPHONE ENCOUNTER
Attempted to contact patient with MRI results but unable to reach her.  Left message for her to call office to schedule follow up left MRI biopsy.

## 2021-02-25 ENCOUNTER — TELEPHONE (OUTPATIENT)
Dept: SURGERY | Facility: CLINIC | Age: 36
End: 2021-02-25

## 2021-02-25 NOTE — TELEPHONE ENCOUNTER
Scheduled Left Mri Guided Breast Biopsy  3-9-21 arrive 6;30am at [facility]  Scheduled with Susan.      Spoke to pt she v/u    Maira

## 2021-02-25 NOTE — TELEPHONE ENCOUNTER
Rx for Valium 10 mg 1 po prior to MRI disp: 1 tablet No refills per Coreen FAUSTIN.  Spoke to Dread at [facility] pharmacy [phone].    CMA

## 2021-03-01 ENCOUNTER — TELEPHONE (OUTPATIENT)
Dept: SURGERY | Facility: CLINIC | Age: 36
End: 2021-03-01

## 2021-03-01 NOTE — TELEPHONE ENCOUNTER
MRI results reported to patient on 2/24/21.  Left breast MRI biopsy is scheduled 3/9/21.  I will contact her with pathology results and follow up recommendations.

## 2021-03-02 ENCOUNTER — HOSPITAL ENCOUNTER (OUTPATIENT)
Dept: ULTRASOUND IMAGING | Facility: HOSPITAL | Age: 36
Discharge: HOME | End: 2021-03-02

## 2021-03-02 ENCOUNTER — HOSPITAL ENCOUNTER (OUTPATIENT)
Dept: MAMMOGRAPHY | Facility: HOSPITAL | Age: 36
Discharge: HOME | End: 2021-03-02

## 2021-03-02 DIAGNOSIS — R92.2 BREAST DENSITY: ICD-10-CM

## 2021-03-02 DIAGNOSIS — N60.11 FIBROCYSTIC BREAST CHANGES, BILATERAL: ICD-10-CM

## 2021-03-02 DIAGNOSIS — R92.8 ABNORMAL MAMMOGRAM: ICD-10-CM

## 2021-03-02 DIAGNOSIS — N60.12 FIBROCYSTIC BREAST CHANGES, BILATERAL: ICD-10-CM

## 2021-03-02 PROCEDURE — 76642 ULTRASOUND BREAST LIMITED: CPT

## 2021-03-02 PROCEDURE — G0279 TOMOSYNTHESIS, MAMMO: HCPCS

## 2021-03-02 PROCEDURE — 77065 DX MAMMO INCL CAD UNI: CPT

## 2021-03-09 ENCOUNTER — HOSPITAL ENCOUNTER (OUTPATIENT)
Dept: MAMMOGRAPHY | Facility: HOSPITAL | Age: 36
Discharge: HOME | End: 2021-03-09

## 2021-03-09 ENCOUNTER — HOSPITAL ENCOUNTER (OUTPATIENT)
Dept: MRI IMAGING | Facility: HOSPITAL | Age: 36
Discharge: HOME | End: 2021-03-09

## 2021-03-09 VITALS
SYSTOLIC BLOOD PRESSURE: 131 MMHG | HEART RATE: 75 BPM | RESPIRATION RATE: 16 BRPM | HEIGHT: 65 IN | WEIGHT: 170 LBS | OXYGEN SATURATION: 98 % | BODY MASS INDEX: 28.32 KG/M2 | DIASTOLIC BLOOD PRESSURE: 73 MMHG | TEMPERATURE: 98.4 F

## 2021-03-09 DIAGNOSIS — N63.20 MASS OF BREAST, LEFT: ICD-10-CM

## 2021-03-09 LAB — CREAT BLDA-MCNC: 0.8 MG/DL (ref 0.6–1.3)

## 2021-03-09 PROCEDURE — A4648 IMPLANTABLE TISSUE MARKER: HCPCS

## 2021-03-09 PROCEDURE — 82565 ASSAY OF CREATININE: CPT

## 2021-03-09 PROCEDURE — A9577 INJ MULTIHANCE: HCPCS | Performed by: NURSE PRACTITIONER

## 2021-03-09 PROCEDURE — 88305 TISSUE EXAM BY PATHOLOGIST: CPT | Performed by: NURSE PRACTITIONER

## 2021-03-09 PROCEDURE — 25010000003 LIDOCAINE 1 % SOLUTION: Performed by: NURSE PRACTITIONER

## 2021-03-09 PROCEDURE — 77065 DX MAMMO INCL CAD UNI: CPT

## 2021-03-09 PROCEDURE — 0 GADOBENATE DIMEGLUMINE 529 MG/ML SOLUTION: Performed by: NURSE PRACTITIONER

## 2021-03-09 RX ORDER — DIAZEPAM 5 MG/1
5 TABLET ORAL ONCE AS NEEDED
Status: COMPLETED | OUTPATIENT
Start: 2021-03-09 | End: 2021-03-09

## 2021-03-09 RX ORDER — LIDOCAINE HYDROCHLORIDE AND EPINEPHRINE 20; 5 MG/ML; UG/ML
10 INJECTION, SOLUTION EPIDURAL; INFILTRATION; INTRACAUDAL; PERINEURAL ONCE
Status: COMPLETED | OUTPATIENT
Start: 2021-03-09 | End: 2021-03-09

## 2021-03-09 RX ORDER — LIDOCAINE HYDROCHLORIDE 10 MG/ML
1 INJECTION, SOLUTION INFILTRATION; PERINEURAL ONCE
Status: COMPLETED | OUTPATIENT
Start: 2021-03-09 | End: 2021-03-09

## 2021-03-09 RX ADMIN — LIDOCAINE HYDROCHLORIDE 1 ML: 10 INJECTION, SOLUTION INFILTRATION; PERINEURAL at 08:51

## 2021-03-09 RX ADMIN — DIAZEPAM 5 MG: 5 TABLET ORAL at 07:53

## 2021-03-09 RX ADMIN — LIDOCAINE HYDROCHLORIDE,EPINEPHRINE BITARTRATE 10 ML: 20; .005 INJECTION, SOLUTION EPIDURAL; INFILTRATION; INTRACAUDAL; PERINEURAL at 08:52

## 2021-03-09 RX ADMIN — GADOBENATE DIMEGLUMINE 15 ML: 529 INJECTION, SOLUTION INTRAVENOUS at 08:25

## 2021-03-09 NOTE — NURSING NOTE
Biopsy site to left medial breast clear with Dermabond dry and intact. No firmness or swelling noted at or around biopsy site. Denies pain. Ice pack with protective covering applied to biopsy site. Discharge instructions discussed with understanding voiced by patient. Copies provided to patient. No distress noted. Patient awake, alert, and oriented x4. Patient to discharge exit via wheelchair per this nurse. To home via private vehicle driven by her father.

## 2021-03-09 NOTE — H&P
Name: Namita Gabriel ADMIT: 3/9/2021   : 1985  PCP: Navin Ly MD    MRN: [id number] LOS: 0 days   AGE/SEX: 36 y.o. female  ROOM: Room/bed info not found       Chief complaint L breast enhancement    Present Illness or Internal History:  Patient is a 36 y.o. female presents with L breast enhancement for MRI bx.     Past Surgical History:  Past Surgical History:   Procedure Laterality Date   • CHOLECYSTECTOMY         Past Medical History:  Past Medical History:   Diagnosis Date   • Chronic neck pain        Home Medications:  (Not in a hospital admission)      Allergies:  Sulfa antibiotics    Family History:  Family History   Problem Relation Age of Onset   • Cancer Mother    • Breast cancer Mother    • Diabetes Father    • Colon cancer Father 65   • No Known Problems Sister    • No Known Problems Brother    • No Known Problems Son    • No Known Problems Daughter    • No Known Problems Maternal Grandmother    • No Known Problems Maternal Grandfather    • No Known Problems Paternal Grandmother    • No Known Problems Paternal Grandfather    • No Known Problems Cousin    • Melanoma Paternal Uncle 55       Social History:  Social History     Tobacco Use   • Smoking status: Current Every Day Smoker     Packs/day: 1.00   • Smokeless tobacco: Never Used   Substance Use Topics   • Alcohol use: Yes     Comment: rare   • Drug use: No        Objective     Physical Exam:    No exam performed today,    Vital Signs  Temp:  [98.4 °F (36.9 °C)] 98.4 °F (36.9 °C)  Heart Rate:  [101] 101  Resp:  [18] 18  BP: (148)/(91) 148/91    Anticipated Surgical Procedure:  Left breast MRI guided core needle biopsy    The risks, benefits and alternatives of this procedure have been discussed with the patient or responsible party: Yes        Dena Mckinnon MD  21  07:22 EST

## 2021-03-10 ENCOUNTER — TELEPHONE (OUTPATIENT)
Dept: SURGERY | Facility: CLINIC | Age: 36
End: 2021-03-10

## 2021-03-10 LAB
LAB AP CASE REPORT: NORMAL
LAB AP DIAGNOSIS COMMENT: NORMAL
PATH REPORT.FINAL DX SPEC: NORMAL
PATH REPORT.GROSS SPEC: NORMAL

## 2021-03-10 NOTE — TELEPHONE ENCOUNTER
MRI biopsy results reported to patient via .  Benign pathology, radiology review pending.  Patient is leaving for vacation in next few days.  I will call her with concordance and follow up when available.

## 2021-03-15 ENCOUNTER — TELEPHONE (OUTPATIENT)
Dept: SURGERY | Facility: CLINIC | Age: 36
End: 2021-03-15

## 2021-03-15 DIAGNOSIS — R92.8 ABNORMAL FINDING ON BREAST IMAGING: Primary | ICD-10-CM

## 2021-03-15 NOTE — TELEPHONE ENCOUNTER
Benign and concordant pathology results reported to patient on 3/12/2021.  Follow up will be scheduled with jimbo diag mammogram and left US in 9/2021. Patient verbalized understanding.

## 2021-03-23 ENCOUNTER — TELEPHONE (OUTPATIENT)
Dept: SURGERY | Facility: CLINIC | Age: 36
End: 2021-03-23

## 2021-03-23 NOTE — TELEPHONE ENCOUNTER
LM on patient's phone.  Scheduled Bilateral 3D Diagnostic Mammo and Left Breast U/S at [facility]  9-13-21 at 1:00    Rescheduled pt's appt off of 8-4-21  To 9-21-21 at 2:00 with Coreen Rao

## 2021-04-08 ENCOUNTER — IMMUNIZATION (OUTPATIENT)
Dept: VACCINE CLINIC | Facility: HOSPITAL | Age: 36
End: 2021-04-08

## 2021-04-08 PROCEDURE — 0001A: CPT | Performed by: OBSTETRICS & GYNECOLOGY

## 2021-04-08 PROCEDURE — 91300 HC SARSCOV02 VAC 30MCG/0.3ML IM: CPT | Performed by: OBSTETRICS & GYNECOLOGY

## 2021-04-29 ENCOUNTER — IMMUNIZATION (OUTPATIENT)
Dept: VACCINE CLINIC | Facility: HOSPITAL | Age: 36
End: 2021-04-29

## 2021-04-29 PROCEDURE — 91300 HC SARSCOV02 VAC 30MCG/0.3ML IM: CPT | Performed by: OBSTETRICS & GYNECOLOGY

## 2021-04-29 PROCEDURE — 0002A: CPT | Performed by: OBSTETRICS & GYNECOLOGY

## 2021-09-13 ENCOUNTER — APPOINTMENT (OUTPATIENT)
Dept: MAMMOGRAPHY | Facility: HOSPITAL | Age: 36
End: 2021-09-13

## 2021-09-13 ENCOUNTER — APPOINTMENT (OUTPATIENT)
Dept: ULTRASOUND IMAGING | Facility: HOSPITAL | Age: 36
End: 2021-09-13

## 2021-09-16 ENCOUNTER — PREP FOR SURGERY (OUTPATIENT)
Dept: OTHER | Facility: HOSPITAL | Age: 36
End: 2021-09-16

## 2021-09-16 DIAGNOSIS — R92.8 ABNORMAL FINDING ON BREAST IMAGING: Primary | ICD-10-CM

## 2021-09-21 ENCOUNTER — OFFICE VISIT (OUTPATIENT)
Dept: SURGERY | Facility: CLINIC | Age: 36
End: 2021-09-21

## 2021-09-21 VITALS
DIASTOLIC BLOOD PRESSURE: 72 MMHG | HEART RATE: 67 BPM | SYSTOLIC BLOOD PRESSURE: 108 MMHG | BODY MASS INDEX: 28.32 KG/M2 | OXYGEN SATURATION: 98 % | HEIGHT: 65 IN | WEIGHT: 170 LBS

## 2021-09-21 DIAGNOSIS — R92.8 ABNORMAL FINDING ON BREAST IMAGING: ICD-10-CM

## 2021-09-21 DIAGNOSIS — Z80.3 FH: BREAST CANCER: ICD-10-CM

## 2021-09-21 DIAGNOSIS — N60.11 FIBROCYSTIC BREAST CHANGES, BILATERAL: Primary | ICD-10-CM

## 2021-09-21 DIAGNOSIS — Z91.89 INCREASED RISK OF BREAST CANCER: ICD-10-CM

## 2021-09-21 DIAGNOSIS — N60.12 FIBROCYSTIC BREAST CHANGES, BILATERAL: Primary | ICD-10-CM

## 2021-09-21 DIAGNOSIS — R92.2 BREAST DENSITY: ICD-10-CM

## 2021-09-21 PROCEDURE — 99214 OFFICE O/P EST MOD 30 MIN: CPT | Performed by: NURSE PRACTITIONER

## 2021-09-21 RX ORDER — IBUPROFEN 200 MG
200 TABLET ORAL EVERY 6 HOURS PRN
COMMUNITY

## 2021-09-21 NOTE — PROGRESS NOTES
[facility]     Referring Provider:  Dr Reese     Chief complaint: breast mass     HPI: Ms. Namita Gabriel is a 35 yo woman, seen at the request of Dr Reese, for abnormal breast imaging and family history of breast cancer.    I personally reviewed her records and summarized her relevant breast history/imaging:    She has no personal history of breast procedures.         She has a family history of breast cancer in her mother diagnosed at age 47,  at age 50.  She denies any family history of breast or ovarian cancer.   Ms. Gabriel has had BRCA testing with negative results.  She is unsure of what testing was completed or when.      2020:  Clinic visit with Dr Reese  Seen in clinic by Dr. Duran for her annual exam with reports of a 4-day episode of severe left breast pain in the outer quadrant with a small amount of white nipple discharge.  The pain was severe for 4 days but had ceased by time of that visit.  Her prior mammogram had been completed in 2019 with no abnormalities noted.  At that visit with exam a 1 cm round mass was noted in the left breast upper outer quadrant.  Diagnostic imaging consisting of diagnostic mammogram with tomosynthesis and bilateral ultrasound was ordered.    2019:  Screening mammogram with tomosynthesis at [facility]  FINDINGS:  The breasts are heterogeneously dense, which may obscure small masses.  There has been no significant change over the comparison interval. No  mammographic evidence of developing malignancy. Recommend repeat  screening mammogram in one year.     COMPARISON IMPRESSION:   Benign screening mammogram, unchanged since 2017.     BI-RADS CATEGORY 2: Benign Findings    2020:bilateral diagnostic mammogram with tomosynthesis, bilateral US at [facility]    FINDINGS:  The breasts are extremely dense, which lowers the sensitivity of mammography.   The right breast does not show any focal or suspicious abnormalities.      Ultrasound the right breast did not show any abnormalities. There is dense glandular tissue with the patient is feeling a lump in the area     In the left breast there is an oval faint density seen on the cc view only. This is in the posterior third of the breast. It measures about 6 mm in diameter. It is 8 cm behind the nipple. It is not visible on the MLO view. Spot compression views performed over this area seems to slightly dissipate. Ultrasound of the left breast at 3-4 o'clock was performed. A small hypoechoic nodule shadowing for bone lesion is seen or o'clock to be some additional nipple. Period nodule shadowing 2 mm  hypoechoic areas noted at 4:00, 5 cm from nipple.     IMPRESSION:  . No suspicious findings were identified in the right breast where the patient indicates oa palpable abnormality. This area was  evaluated by the technologist and myself. Dense fibrous tissue is present in this region on ultrasound. Continued clinical follow-up is recommended. No additional imaging follow-up is recommended.     In the left breast there is a posterior faint oval density which is only seen on the CC projection. Ultrasound of this region did not show any suspicious correlate. Ultrasound did show 2 indeterminate but likely benign hypoechoic areas measuring up to 4 mm in diameter. These are likely complex cysts.     This patient has very dense breasts and has a family history of breast cancer mother at age 47. Bilateral breast MRI screening examination should be considered.     Recommend 6 month follow-up left diagnostic mammogram and ultrasound. If the breast MRI is performed and is negative then this follow-up would not be necessary.     .BI-RADS Category 3      9/23/2020: Return clinic visit with Dr. Reese  She return for follow-up with reports that the breast pain had completely resolved.  She was unable to palpate the mass that she had previously noted.  Imaging findings were reviewed and MRI preferred by  both patient and Dr. Duran.    2/2/21:  Today she presents with no breast complaints.  She denies any breast lumps, pain, skin changes, or nipple discharge.     She reports that she is otherwise healthy.   She has experienced a recent weight gain and is a smoker.    9/21/21, Interval History:  She returns today with no breast changes.      2/23/21, breast MRI BiRAds 4.  (see full report below)    3/2/21, left diagnostic mmamogram, left US, BiRAds 4 ( see full report below)    3/9/21: Left MRI biopsy, benign and concorant. (see full report below)    Review of Systems   Constitutional: Positive for unexpected weight change.   HENT:  Negative.    Eyes: Negative.    Respiratory: Negative.    Cardiovascular: Negative.    Gastrointestinal: Positive for constipation and diarrhea.   Endocrine: Negative.    Genitourinary: Negative.     Musculoskeletal: Positive for arthralgias and back pain.   Skin: Negative.    Neurological: Negative.    Hematological: Negative.    Psychiatric/Behavioral: The patient is nervous/anxious.        Medications:    Current Outpatient Medications:   •  ibuprofen (ADVIL,MOTRIN) 200 MG tablet, Take 200 mg by mouth Every 6 (Six) Hours As Needed for Mild Pain ., Disp: , Rfl:     Allergies:  Allergies   Allergen Reactions   • Sulfa Antibiotics Hives       Medical history:  Past Medical History:   Diagnosis Date   • Chronic neck pain        Surgical History:  Past Surgical History:   Procedure Laterality Date   • CHOLECYSTECTOMY         Family History:  Family History   Problem Relation Age of Onset   • Cancer Mother    • Breast cancer Mother    • Diabetes Father    • Colon cancer Father 65   • No Known Problems Sister    • No Known Problems Brother    • No Known Problems Son    • No Known Problems Daughter    • No Known Problems Maternal Grandmother    • No Known Problems Maternal Grandfather    • No Known Problems Paternal Grandmother    • No Known Problems Paternal Grandfather    • No Known  Problems Cousin    • Melanoma Paternal Uncle 55       Social History:   Social History     Socioeconomic History   • Marital status:      Spouse name: Not on file   • Number of children: 2   • Years of education: Not on file   • Highest education level: Not on file   Tobacco Use   • Smoking status: Current Every Day Smoker     Packs/day: 1.00   • Smokeless tobacco: Never Used   Substance and Sexual Activity   • Alcohol use: Yes     Comment: rare   • Drug use: No   • Sexual activity: Yes     Partners: Male     Patient drinks 5 servings of caffeine per day.       GYNECOLOGIC HISTORY:   . P: 2. AB: 0.  Age at menarche: 14  Age at first childbirth: 26  Lactation/How lon month  Age at menopause: n/a  Total years of oral contraceptive use: 3  Total years of hormone replacement therapy: 0      Physical Exam  Vitals:    21 1341   BP: 108/72   Pulse: 67   SpO2: 98%      I reviewed physical exam, no changes noted    ECOG 0 - Asymptomatic  General: NAD, well appearing  Psych: a&o x 3, normal mood and affect  MSK: normal gait, normal ROM in bilateral shoulders  Lymph nodes:  no cervical, supraclavicular or axillary lymphadenopathy  Breast: symmetric, slightly ptotic bilaterally with diffusely nodular tissue.  Right: No visible abnormalities on inspection while seated, with arms raised or hands on hips. No masses, skin changes, or nipple abnormalities.  Left:  No visible abnormalities on inspection while seated, with arms raised or hands on hips. No masses, skin changes, or nipple abnormalities.    IMAGIN2021:  Breast MRI at [facility]   FINDINGS:Scattered fibroglandular tissue is seen throughout both breasts. Moderate background parenchymal enhancement of both breasts is noted.  In the middle third of the left breast at the 6 o'clock position centered on the order of 7 cm from the nipple there is discontinuous borderline clumped enhancement that measures on the order of 3.9 cm in the anterior  to posterior dimension, 1.5 cm in the superior to inferior dimension and 0.8 cm in the medial to lateral dimension. No mammographic  abnormality within this region is appreciated.   No other areas of abnormal enhancement or morphology are seen within the left breast. I see no evidence for abnormal left breast skin, nipple or chest wall enhancement and there is no evidence for left axillary or internal mammary chain adenopathy.   There are no areas of abnormal enhancement or morphology in the right breast. I see no evidence for abnormal right breast skin, nipple or chest wall enhancement and there is no evidence for right axillary or internal mammary chain adenopathy.     IMPRESSION:  1. Discontinuous borderline clumped enhancement is seen in the left breast at the 6 o'clock position in the middle third on the order of 7 cm from the nipple extending over approximately 3.9 cm in length.  Correlation with an MR-guided left breast biopsy is recommended.  2. There are no findings suspicious for malignancy in the right breast.   BI-RADS CATEGORY 4: Suspicious abnormality.Biopsy should be considered.    3/2/2021:  Left diagnostic mammogram, left breast US at [facility]  MAMMOGRAM FINDINGS:  Breast parenchyma remains extremely dense, grading sensitivity of mammography. There is no new dominant nodule or mass in the left breast. No new suspicious clustered microcalcifications. The subtle oval nodular density in the posterior left breast on the CC view described on the prior study is not visible today and likely represented a summation artifact or prominent fibroglandular tissue. Ultrasound was performed for six-month follow-up of the probably benign complex cysts in the left breast described on the prior study.   ULTRASOUND FINDINGS:  The patient was initially scanned independently by the technologist and also rescanned in my presence.   Repeat imaging of the 4:00 position left breast was performed. The tiny complex cyst  in the 4:00 position left breast is unchanged, measuring about 4 mm. It is located about 3 cm from the nipple. A second probable tiny complex cyst or mildly prominent duct at 4:00, 5 cm from the nipple on the prior study has no correlate today.     Intervening breast MRI at outside facility reportedly demonstrated an area of clumped discontinuous enhancement in the 6:00 position left breast for which MRI directed biopsy has been recommended. Real-time observation of the 6:00 position left breast with ultrasound is negative. There is also no mammographic correlate. Please refer to the breast MRI report for further details.     SUMMARY:  The probably benign complex cyst in the 4:00 position left breast is unchanged. Intervening breast MRI in this clock position on the left was negative as well. The patient is scheduled for MR directed biopsy of abnormal clumped enhancement 6:00 position left breast which has no ultrasound or mammography correlate today. The patient should proceed with the planned biopsy. FINDINGS and recommendations were discussed with the patient.   IMPRESSION:  1. Stable probably benign complex cyst in the 4:00 position left breast.  2. Second probable tiny complex cyst at 4:00 on the prior ultrasound is not visible today.  3. Previously described oval nodular density in the posterior left breast on CC imaging has resolved.  4. Abnormal intervening breast MRI at outside facility. MR directed biopsy for abnormal clumped enhancement in the 6:00 position left breast has been recommended. Please see the outside MRI report 02/23/2021 for further details. Additional follow-up recommendations will be generated following the MR directed biopsy.   BI-RADS CATEGORY 4: Suspicious abnormality    3/9/2021:  Left MRI biopsy at [facility]  PROCEDURE: MRI of the left breast was performed using a dedicated breast coil and biopsy system. Multiplanar images of the breast were obtained before and after the  administration of intravenous gadolinium.    The nonmass enhancement in the lower left breast was targeted via a medial approach. An 8 gauge Mammotome vacuum-assisted biopsy device  was then inserted and 14 core samples were obtained. Post-biopsy MRI images confirmed adequate sampling. A bowtie clip was deployed at the biopsy site.    The patient tolerated the procedure well with no immediate complications.   Post-procedural digital orthogonal mammographic views of the left breast demonstrate the bowtie clip at the expected location at the site of biopsy in the lower slightly inner middle left breast.     PATHOLOGY:   Final Diagnosis    1. Left Breast, 7:00 o'clock, MRI Guided Core Biopsies for Clumped Enhancement: ?  A. Benign breast parenchyma with foci of ductal hyperplasia, usual type.  B. No atypical hyperplasia, in-situ nor invasive carcinoma identified.    Electronically signed by Coretta Espinoza MD on 3/10/2021 at 1355    IMPRESSION/RECOMMENDATIONS:  1. MRI guided biopsy of a 3.9 cm area of clumped nonmass enhancement in the lower left breast, marked by a bowtie clip. Pathology is benign and concordant with the imaging assessment.    2. Please refer to outside institution reports regarding follow-up for a probably benign mass described at 4:00 in the left breast.        Assessment:    1)  36 y.o. F with benign left breast MRI biopsy 3/21  2)  abnormal left breast imaging for which surveillance is recommended, 8/2020   3)  Benign breast changes  4)  Breast density  5)  Family history of breast cancer, lifetime risk per tyrer cuzick model is 20.6%  6)  Tobacco dependence, currently 1 PPD      Discussion:  1)  We reviewed her imaging studies, biopsy and pathology results.  She is due her follow up imaging and has it scheduled for 10/5/2021.  I will call her with those results and discuss follow up recommendations.     2) Her breast tissue is heterogeneously to extremely dense.  I advised her that breast  density describes how the breasts look on a mammogram.  Breast and connective tissue are denser than fat and this difference shows up on the mammogram.  Young women often have dense breasts.  As we age, breast become less dense.  Dense breast can make it harder to find breast cancer on the mammogram.  Women with high breast density have an increased risk of breast cancer.  Educational materials regarding breast density were given and reviewed.  Tomosynthesis imaging will be completed with next screening study.    3)  Her risk for breast cancer based on her family history was calculated and found to be a lifetime risk of 20.6% per Tyer Cuzick model and 17.6% lifetime risk per [place] model her 5-year risk for S[place]ane model is 0.6%.  We discussed that greater than 20% lifetime risk is considered high risk.  A copy of NCCN high risk guidelines was given and reviewed.  We discussed management options for individuals who are at increased risk (>20% lifetime risk):   1) High risk screening - Annual mammogram and annual breast MRI, alternating one test every 6 months, biannual clinical exam and monthly self breast exam. She meets criteria for high risk screening.   2) Chemoprevention with Tamoxifen, Raloxifene or Exemestane. These may reduce risk up to 50%. I reviewed that these particular medications are not without risks and the risk/benefit ratio must be considered carefully. She is not interested in this currently.   3) Risk reducing surgery such as prophylactic mastectomy  which may reduce risk by 90-95%. We discussed that this is a relatively radical strategy and is generally reserved for individuals with a known genetic mutation predisposing them to an increased risk (~50% risk) of breast cancer.    4) I discussed the importance of exercise and weight management as part of a risk reducing strategy, since increased BMI is associated with an increased risk of breast cancer. This is especially true in her case, as I expect  her risk would decrease as she lost weight.  4)  She is a current every day smoker, 1 PPD.  And is aware of the adverse effects from this.  I advised her to work on decreasing or ceasing smoking altogether if possible.  5) her BMI is 29.95 today this is considered overweight.  She is working on diet and exercise changes for weight loss.    Plan:  - bilateral diagnostic mammogram and left US scheduled on 10/5/21 at [facility].  I will call her those results and follow up recommendations.    - We will plan breast MRI in 6 months at [facility] followed by exam.    She is interested in increased surveillance given her increased risk, is not interested in chemoprevention at this time.      I have advised the patient to continue monthly breast self examination.  She was also advised to notify us if she develops new breast symptoms.       ROXIE Perez          CC:  Dr Mary Ann Ly, Navin CASANOVA MD    EMR Dragon/transcription disclaimer:  Dictated using Dragon dictation

## 2021-10-05 ENCOUNTER — HOSPITAL ENCOUNTER (OUTPATIENT)
Dept: ULTRASOUND IMAGING | Facility: HOSPITAL | Age: 36
Discharge: HOME | End: 2021-10-05
Admitting: NURSE PRACTITIONER

## 2021-10-05 ENCOUNTER — HOSPITAL ENCOUNTER (OUTPATIENT)
Dept: MAMMOGRAPHY | Facility: HOSPITAL | Age: 36
Discharge: HOME | End: 2021-10-05
Admitting: NURSE PRACTITIONER

## 2021-10-05 DIAGNOSIS — R92.8 ABNORMAL FINDING ON BREAST IMAGING: ICD-10-CM

## 2021-10-05 PROCEDURE — G0279 TOMOSYNTHESIS, MAMMO: HCPCS

## 2021-10-05 PROCEDURE — 77066 DX MAMMO INCL CAD BI: CPT

## 2021-10-05 PROCEDURE — 76642 ULTRASOUND BREAST LIMITED: CPT

## 2021-10-06 ENCOUNTER — TELEPHONE (OUTPATIENT)
Dept: SURGERY | Facility: CLINIC | Age: 36
End: 2021-10-06

## 2021-10-06 DIAGNOSIS — Z91.89 INCREASED RISK OF BREAST CANCER: Primary | ICD-10-CM

## 2021-10-06 DIAGNOSIS — Z80.3 FH: BREAST CANCER: ICD-10-CM

## 2021-10-06 NOTE — TELEPHONE ENCOUNTER
Bilateral diagnostic mammogram and bilateral US results reported to patient. Stable findings with screening in one year.    Breast MRI will be scheduled in 6 months followed by exam.    IMPRESSION:  1. Benign bilateral diagnostic mammogram and targeted bilateral breast  ultrasound     BI-RADS CATEGORY 2: Benign Findings.

## 2021-10-14 ENCOUNTER — DOCUMENTATION (OUTPATIENT)
Dept: SURGERY | Facility: CLINIC | Age: 36
End: 2021-10-14

## 2021-10-14 ENCOUNTER — TELEPHONE (OUTPATIENT)
Dept: SURGERY | Facility: CLINIC | Age: 36
End: 2021-10-14

## 2022-04-18 ENCOUNTER — TELEPHONE (OUTPATIENT)
Dept: SURGERY | Facility: CLINIC | Age: 37
End: 2022-04-18

## 2022-04-18 NOTE — TELEPHONE ENCOUNTER
Patient calls to get her follow up apt with [facility] following Breast MRI 4/25/22.     I've scheduled Tuesday May 10 8:30. Patient is aware.     She would like valium called in prior to MRI if possible.

## 2022-04-25 ENCOUNTER — HOSPITAL ENCOUNTER (OUTPATIENT)
Dept: MRI IMAGING | Facility: HOSPITAL | Age: 37
Discharge: HOME | End: 2022-04-25
Admitting: NURSE PRACTITIONER

## 2022-04-25 DIAGNOSIS — Z91.89 INCREASED RISK OF BREAST CANCER: ICD-10-CM

## 2022-04-25 DIAGNOSIS — Z80.3 FH: BREAST CANCER: ICD-10-CM

## 2022-04-25 PROCEDURE — 0 GADOBENATE DIMEGLUMINE 529 MG/ML SOLUTION: Performed by: NURSE PRACTITIONER

## 2022-04-25 PROCEDURE — 77049 MRI BREAST C-+ W/CAD BI: CPT

## 2022-04-25 PROCEDURE — A9577 INJ MULTIHANCE: HCPCS | Performed by: NURSE PRACTITIONER

## 2022-04-25 RX ADMIN — GADOBENATE DIMEGLUMINE 15 ML: 529 INJECTION, SOLUTION INTRAVENOUS at 09:34

## 2022-05-10 ENCOUNTER — OFFICE VISIT (OUTPATIENT)
Dept: SURGERY | Facility: CLINIC | Age: 37
End: 2022-05-10

## 2022-05-10 ENCOUNTER — TELEPHONE (OUTPATIENT)
Dept: SURGERY | Facility: CLINIC | Age: 37
End: 2022-05-10

## 2022-05-10 VITALS
HEIGHT: 65 IN | HEART RATE: 84 BPM | BODY MASS INDEX: 28.32 KG/M2 | WEIGHT: 170 LBS | SYSTOLIC BLOOD PRESSURE: 114 MMHG | DIASTOLIC BLOOD PRESSURE: 75 MMHG

## 2022-05-10 DIAGNOSIS — R92.2 BREAST DENSITY: ICD-10-CM

## 2022-05-10 DIAGNOSIS — Z91.89 INCREASED RISK OF BREAST CANCER: ICD-10-CM

## 2022-05-10 DIAGNOSIS — N60.12 FIBROCYSTIC BREAST CHANGES, BILATERAL: ICD-10-CM

## 2022-05-10 DIAGNOSIS — Z80.3 FH: BREAST CANCER: ICD-10-CM

## 2022-05-10 DIAGNOSIS — N60.11 FIBROCYSTIC BREAST CHANGES, BILATERAL: ICD-10-CM

## 2022-05-10 DIAGNOSIS — R92.8 ABNORMAL FINDING ON BREAST IMAGING: Primary | ICD-10-CM

## 2022-05-10 DIAGNOSIS — F17.200 SMOKING: ICD-10-CM

## 2022-05-10 PROCEDURE — 99213 OFFICE O/P EST LOW 20 MIN: CPT | Performed by: NURSE PRACTITIONER

## 2022-05-10 RX ORDER — CYCLOBENZAPRINE HCL 10 MG
10 TABLET ORAL DAILY PRN
COMMUNITY

## 2022-10-25 ENCOUNTER — HOSPITAL ENCOUNTER (OUTPATIENT)
Dept: MAMMOGRAPHY | Facility: HOSPITAL | Age: 37
Discharge: HOME | End: 2022-10-25
Admitting: NURSE PRACTITIONER

## 2022-10-25 DIAGNOSIS — N60.11 FIBROCYSTIC BREAST CHANGES, BILATERAL: ICD-10-CM

## 2022-10-25 DIAGNOSIS — N60.12 FIBROCYSTIC BREAST CHANGES, BILATERAL: ICD-10-CM

## 2022-10-25 DIAGNOSIS — Z91.89 INCREASED RISK OF BREAST CANCER: ICD-10-CM

## 2022-10-25 DIAGNOSIS — Z80.3 FH: BREAST CANCER: ICD-10-CM

## 2022-10-25 DIAGNOSIS — R92.2 BREAST DENSITY: ICD-10-CM

## 2022-10-25 PROCEDURE — 77067 SCR MAMMO BI INCL CAD: CPT

## 2022-10-25 PROCEDURE — 77063 BREAST TOMOSYNTHESIS BI: CPT

## 2022-11-22 ENCOUNTER — OFFICE VISIT (OUTPATIENT)
Dept: SURGERY | Facility: CLINIC | Age: 37
End: 2022-11-22

## 2022-11-22 ENCOUNTER — TELEPHONE (OUTPATIENT)
Dept: SURGERY | Facility: CLINIC | Age: 37
End: 2022-11-22

## 2022-11-22 VITALS
WEIGHT: 170 LBS | HEIGHT: 65 IN | SYSTOLIC BLOOD PRESSURE: 110 MMHG | DIASTOLIC BLOOD PRESSURE: 70 MMHG | BODY MASS INDEX: 28.32 KG/M2

## 2022-11-22 DIAGNOSIS — Z80.3 FH: BREAST CANCER: ICD-10-CM

## 2022-11-22 DIAGNOSIS — N60.12 FIBROCYSTIC BREAST CHANGES, BILATERAL: Primary | ICD-10-CM

## 2022-11-22 DIAGNOSIS — Z91.89 INCREASED RISK OF BREAST CANCER: ICD-10-CM

## 2022-11-22 DIAGNOSIS — R92.2 BREAST DENSITY: ICD-10-CM

## 2022-11-22 DIAGNOSIS — N60.11 FIBROCYSTIC BREAST CHANGES, BILATERAL: Primary | ICD-10-CM

## 2022-11-22 PROCEDURE — 99213 OFFICE O/P EST LOW 20 MIN: CPT | Performed by: NURSE PRACTITIONER

## 2022-11-22 NOTE — PROGRESS NOTES
[facility]     Referring Provider:  Dr Reese     Chief complaint: breast mass     HPI: Ms. Namita Gabriel is a 35 yo woman, seen at the request of Dr Reese, for abnormal breast imaging and family history of breast cancer.    I personally reviewed her records and summarized her relevant breast history/imaging:  She has no personal history of breast procedures.       She has a family history of breast cancer in her mother diagnosed at age 47,  at age 50.  She denies any family history of breast or ovarian cancer.   Ms. Gabriel has had BRCA testing with negative results.  She is unsure of what testing was completed or when.      2020:  Clinic visit with Dr Reese  Seen in clinic by Dr. Duran for her annual exam with reports of a 4-day episode of severe left breast pain in the outer quadrant with a small amount of white nipple discharge.  The pain was severe for 4 days but had ceased by time of that visit.  Her prior mammogram had been completed in 2019 with no abnormalities noted.  At that visit with exam a 1 cm round mass was noted in the left breast upper outer quadrant.  Diagnostic imaging consisting of diagnostic mammogram with tomosynthesis and bilateral ultrasound was ordered.    2019:  Screening mammogram with tomosynthesis at [facility]  FINDINGS:  The breasts are heterogeneously dense, which may obscure small masses.  There has been no significant change over the comparison interval. No  mammographic evidence of developing malignancy. Recommend repeat  screening mammogram in one year.   COMPARISON IMPRESSION:   Benign screening mammogram, unchanged since 2017.   BI-RADS CATEGORY 2: Benign Findings    2020:bilateral diagnostic mammogram with tomosynthesis, bilateral US at [facility] [place]  FINDINGS:  The breasts are extremely dense, which lowers the sensitivity of mammography.   The right breast does not show any focal or suspicious abnormalities.    Ultrasound the right breast did not show any abnormalities. There is dense glandular tissue with the patient is feeling a lump in the area   In the left breast there is an oval faint density seen on the cc view only. This is in the posterior third of the breast. It measures about 6 mm in diameter. It is 8 cm behind the nipple. It is not visible on the MLO view. Spot compression views performed over this area seems to slightly dissipate. Ultrasound of the left breast at 3-4 o'clock was performed. A small hypoechoic nodule shadowing for bone lesion is seen or o'clock to be some additional nipple. Period nodule shadowing 2 mm  hypoechoic areas noted at 4:00, 5 cm from nipple.   IMPRESSION:  . No suspicious findings were identified in the right breast where the patient indicates oa palpable abnormality. This area was  evaluated by the technologist and myself. Dense fibrous tissue is present in this region on ultrasound. Continued clinical follow-up is recommended. No additional imaging follow-up is recommended.   In the left breast there is a posterior faint oval density which is only seen on the CC projection. Ultrasound of this region did not show any suspicious correlate. Ultrasound did show 2 indeterminate but likely benign hypoechoic areas measuring up to 4 mm in diameter. These are likely complex cysts.  This patient has very dense breasts and has a family history of breast cancer mother at age 47. Bilateral breast MRI screening examination should be considered.   Recommend 6 month follow-up left diagnostic mammogram and ultrasound. If the breast MRI is performed and is negative then this follow-up would not be necessary.   .BI-RADS Category 3      9/23/2020: Return clinic visit with Dr. Reese  She return for follow-up with reports that the breast pain had completely resolved.  She was unable to palpate the mass that she had previously noted.  Imaging findings were reviewed and MRI preferred by both patient and  Dr. Duran.    2/2/21:  Today she presents with no breast complaints.  She denies any breast lumps, pain, skin changes, or nipple discharge.   She reports that she is otherwise healthy.   She has experienced a recent weight gain and is a smoker.    9/21/21:  She returns today with no breast changes.      2/23/21, breast MRI BiRAds 4.  (see full report below)  3/2/21, left diagnostic mmamogram, left US, BiRAds 4 ( see full report below)  3/9/21: Left MRI biopsy, benign and concorant. (see full report below)    5/10/22:  She returns today for follow up with no breast changes, stable health    Bilateral diagnostic mammogram with tomosynthesis, bilateral breast US on 10/5/21 was stable, BiRads 2.  (see full report below)    Breast MRI on 4/25/22 was stable, biRAds 2.  (see full report below)    11/22/22, Interval History:  She returns today for follow up with no breast concerns or health changes.    Screening mammogram with tomosynthesis on 10/25/22 was stable, BiRAds 1.  (see full report below)        Review of Systems   Constitutional: Positive for unexpected weight change.   HENT:  Negative.    Eyes: Negative.    Respiratory: Negative.    Cardiovascular: Negative.    Gastrointestinal: Positive for constipation and diarrhea.   Endocrine: Negative.    Genitourinary: Negative.     Musculoskeletal: Positive for arthralgias and back pain.   Skin: Negative.    Neurological: Negative.    Hematological: Negative.    Psychiatric/Behavioral: The patient is nervous/anxious.        Medications:    Current Outpatient Medications:   •  cyclobenzaprine (FLEXERIL) 10 MG tablet, Take 10 mg by mouth Daily As Needed for Muscle Spasms., Disp: , Rfl:   •  ibuprofen (ADVIL,MOTRIN) 200 MG tablet, Take 200 mg by mouth Every 6 (Six) Hours As Needed for Mild Pain ., Disp: , Rfl:     Allergies:  Allergies   Allergen Reactions   • Sulfa Antibiotics Hives       Medical history:  Past Medical History:   Diagnosis Date   • Chronic neck pain   "      Surgical History:  Past Surgical History:   Procedure Laterality Date   • BREAST BIOPSY Left 2021    benign through MRI   • CHOLECYSTECTOMY         Family History:  Family History   Problem Relation Age of Onset   • Cancer Mother    • Breast cancer Mother    • Diabetes Father    • Colon cancer Father 65   • No Known Problems Sister    • No Known Problems Brother    • No Known Problems Son    • No Known Problems Daughter    • No Known Problems Maternal Grandmother    • No Known Problems Maternal Grandfather    • No Known Problems Paternal Grandmother    • No Known Problems Paternal Grandfather    • No Known Problems Cousin    • Melanoma Paternal Uncle 55       Social History:   Social History     Socioeconomic History   • Marital status:    • Number of children: 2   Tobacco Use   • Smoking status: Every Day     Packs/day: 1.00     Types: Cigarettes   • Smokeless tobacco: Never   Substance and Sexual Activity   • Alcohol use: Yes     Comment: rare   • Drug use: No   • Sexual activity: Yes     Partners: Male     Patient drinks 5 servings of caffeine per day.       GYNECOLOGIC HISTORY:   . P: 2. AB: 0.  Age at menarche: 14  Age at first childbirth: 26  Lactation/How lon month  Age at menopause: n/a  Total years of oral contraceptive use: 3  Total years of hormone replacement therapy: 0      Physical Exam  Vitals:    22 0923   BP: 110/70    /70 (BP Location: Left arm)   Ht 165.1 cm (65\")   Wt 77.1 kg (170 lb)   BMI 28.29 kg/m²     I reviewed physical exam, no changes noted    ECOG 0 - Asymptomatic  General: NAD, well appearing  Psych: a&o x 3, normal mood and affect  MSK: normal gait, normal ROM in bilateral shoulders  Lymph nodes:  no cervical, supraclavicular or axillary lymphadenopathy  Breast: symmetric, slightly ptotic bilaterally with diffusely nodular tissue.  Right: No visible abnormalities on inspection while seated, with arms raised or hands on hips. No masses, skin " changes, or nipple abnormalities.  Left:  No visible abnormalities on inspection while seated, with arms raised or hands on hips. No masses, skin changes, or nipple abnormalities.    IMAGIN2021:  Breast MRI at [facility]   FINDINGS:Scattered fibroglandular tissue is seen throughout both breasts. Moderate background parenchymal enhancement of both breasts is noted.  In the middle third of the left breast at the 6 o'clock position centered on the order of 7 cm from the nipple there is discontinuous borderline clumped enhancement that measures on the order of 3.9 cm in the anterior to posterior dimension, 1.5 cm in the superior to inferior dimension and 0.8 cm in the medial to lateral dimension. No mammographic  abnormality within this region is appreciated.   No other areas of abnormal enhancement or morphology are seen within the left breast. I see no evidence for abnormal left breast skin, nipple or chest wall enhancement and there is no evidence for left axillary or internal mammary chain adenopathy.   There are no areas of abnormal enhancement or morphology in the right breast. I see no evidence for abnormal right breast skin, nipple or chest wall enhancement and there is no evidence for right axillary or internal mammary chain adenopathy.     IMPRESSION:  1. Discontinuous borderline clumped enhancement is seen in the left breast at the 6 o'clock position in the middle third on the order of 7 cm from the nipple extending over approximately 3.9 cm in length.  Correlation with an MR-guided left breast biopsy is recommended.  2. There are no findings suspicious for malignancy in the right breast.   BI-RADS CATEGORY 4: Suspicious abnormality.Biopsy should be considered.    3/2/2021:  Left diagnostic mammogram, left breast US at [facility]  MAMMOGRAM FINDINGS:  Breast parenchyma remains extremely dense, grading sensitivity of mammography. There is no new dominant nodule or mass in the left breast. No new  suspicious clustered microcalcifications. The subtle oval nodular density in the posterior left breast on the CC view described on the prior study is not visible today and likely represented a summation artifact or prominent fibroglandular tissue. Ultrasound was performed for six-month follow-up of the probably benign complex cysts in the left breast described on the prior study.   ULTRASOUND FINDINGS:  The patient was initially scanned independently by the technologist and also rescanned in my presence.   Repeat imaging of the 4:00 position left breast was performed. The tiny complex cyst in the 4:00 position left breast is unchanged, measuring about 4 mm. It is located about 3 cm from the nipple. A second probable tiny complex cyst or mildly prominent duct at 4:00, 5 cm from the nipple on the prior study has no correlate today.     Intervening breast MRI at outside facility reportedly demonstrated an area of clumped discontinuous enhancement in the 6:00 position left breast for which MRI directed biopsy has been recommended. Real-time observation of the 6:00 position left breast with ultrasound is negative. There is also no mammographic correlate. Please refer to the breast MRI report for further details.     SUMMARY:  The probably benign complex cyst in the 4:00 position left breast is unchanged. Intervening breast MRI in this clock position on the left was negative as well. The patient is scheduled for MR directed biopsy of abnormal clumped enhancement 6:00 position left breast which has no ultrasound or mammography correlate today. The patient should proceed with the planned biopsy. FINDINGS and recommendations were discussed with the patient.   IMPRESSION:  1. Stable probably benign complex cyst in the 4:00 position left breast.  2. Second probable tiny complex cyst at 4:00 on the prior ultrasound is not visible today.  3. Previously described oval nodular density in the posterior left breast on CC imaging has  resolved.  4. Abnormal intervening breast MRI at outside facility. MR directed biopsy for abnormal clumped enhancement in the 6:00 position left breast has been recommended. Please see the outside MRI report 02/23/2021 for further details. Additional follow-up recommendations will be generated following the MR directed biopsy.   BI-RADS CATEGORY 4: Suspicious abnormality    3/9/2021:  Left MRI biopsy at [facility]  PROCEDURE: MRI of the left breast was performed using a dedicated breast coil and biopsy system. Multiplanar images of the breast were obtained before and after the administration of intravenous gadolinium.    The nonmass enhancement in the lower left breast was targeted via a medial approach. An 8 gauge Mammotome vacuum-assisted biopsy device  was then inserted and 14 core samples were obtained. Post-biopsy MRI images confirmed adequate sampling. A bowtie clip was deployed at the biopsy site.    The patient tolerated the procedure well with no immediate complications.   Post-procedural digital orthogonal mammographic views of the left breast demonstrate the bowtie clip at the expected location at the site of biopsy in the lower slightly inner middle left breast.     PATHOLOGY:   Final Diagnosis    1. Left Breast, 7:00 o'clock, MRI Guided Core Biopsies for Clumped Enhancement: ?  A. Benign breast parenchyma with foci of ductal hyperplasia, usual type.  B. No atypical hyperplasia, in-situ nor invasive carcinoma identified.    Electronically signed by Coretta Espinoza MD on 3/10/2021 at 1355    IMPRESSION/RECOMMENDATIONS:  1. MRI guided biopsy of a 3.9 cm area of clumped nonmass enhancement in the lower left breast, marked by a bowtie clip. Pathology is benign and concordant with the imaging assessment.    2. Please refer to outside institution reports regarding follow-up for a probably benign mass described at 4:00 in the left breast.    10/5/2021:  Bilateral diagnostic mammogram with  tomosynthesis, bilateral limited US at  [place]  MAMMOGRAM FINDINGS:  Breast parenchyma is heterogeneously dense, degrading sensitivity of mammography. The pattern is unchanged. There is a new biopsy clip in the lower inner quadrant central left breast related to intervening breast biopsy performed with MRI guidance that yielded benign results. There is no new dominant nodule or mass in either breast. No new suspicious  cluster of microcalcifications. There is heterogeneously dense breast tissue in the upper outer quadrant right breast. This appears stable to slightly more prominent over the comparison interval. This area was further evaluated with targeted ultrasound.  ULTRASOUND FINDINGS:  The patient was initially scanned independently by the technologist and also rescanned in my presence.     Right breast: Imaging of the upper outer quadrant right breast was performed for further evaluation of the asymmetrically prominent breast tissue. There is no cystic or solid mass or persistent shadowing abnormality. Imaging findings are concordant with mammography.  Left breast: Imaging of the 4:00 position left breast was performed to reassess stability of the probably benign complex cyst 3 cm from the nipple. Ultrasound demonstrates a stable complex cyst or mildly heterogeneous area of breast tissue. The appearance is unchanged over the comparison interval of greater than one year and most characteristic of a benign etiology under real-time surveillance.  SUMMARY:  Previously documented mildly complex cyst or heterogeneous tissue in the 4:00 position left breast is unchanged over the past year, most characteristic of benign etiology.  Asymmetrically prominent breast tissue in the upper outer quadrant right breast, stable for technical factors. Absent new or worsening symptoms in either breast, return to annual screening recommended. FINDINGS and recommendations discussed with the patient.   IMPRESSION:  1. Benign  bilateral diagnostic mammogram and targeted bilateral breast ultrasound  BI-RADS CATEGORY 2: Benign Findings.    4/25/2022:  Breast MRI at [facility]  FINDINGS: There is heterogeneous fibroglandular tissue. There is moderate background parenchymal enhancement.   RIGHT BREAST:    No suspicious enhancing mass or area of non-mass enhancement is identified.  The visualized axilla is within normal limits.    LEFT BREAST:    At 7:00 in the middle left breast, 7.4 cm posterior to the nipple, there is a focus of susceptibility from a biopsy clip. No suspicious enhancing mass or area of non-mass enhancement is identified. The visualized axilla is within normal limits.    EXTRAMAMMARY FINDINGS:   There are no abnormally enlarged internal mammary chain lymph nodes on either side.     In the peripheral right hepatic lobe, there is a 1.2 cm STIR hyperintense progressively enhancing lesion, which is not significantly changed from 2/23/2021 and likely reflects a hemangioma.    IMPRESSION AND RECOMMENDATION:   No MRI evidence of malignancy in either breast. Routine screening is recommended.   BI-RADS Category 2: Benign    10/25/2022: bilateral screening mammogram with tomosynthesis at [facility]  FINDINGS:  The breasts are heterogeneously dense, which may obscure small masses. Percutaneous biopsy clip marker along the 7:00 axis left breast. No significant change when compared with prior images. No mammographic evidence of malignancy. Recommend repeat screening mammogram in one year.  IMPRESSION:  Negative screening mammogram.  BI-RADS CATEGORY 1: Negative        Assessment:    1)  37 y.o. F with benign left breast MRI biopsy 3/21  2)  abnormal left breast imaging for which surveillance is recommended, 8/2020, stable 10/2021 with routine follow up  3)  Benign breast changes  4)  Breast density  5)  Family history of breast cancer, lifetime risk per tyrer cuzick model is 20.6%  6)  Tobacco dependence, currently 1 PPD      Discussion:  We  reviewed her imaging studies, screening mammogram is stable, BiRads 2.     Her risk for breast cancer based on her family history was calculated at her initial visit and found to be a lifetime risk of 20.6% per Tyer Cuzick model and 17.6% lifetime risk per Sloane model her 5-year risk for Sloane model is 0.6%.  We discussed that greater than 20% lifetime risk is considered high risk.  A copy of NCCN high risk guidelines was given and reviewed.  We discussed management options for individuals who are at increased risk (>20% lifetime risk):   1) High risk screening - Annual mammogram and annual breast MRI, alternating one test every 6 months, biannual clinical exam and monthly self breast exam. She meets criteria for high risk screening.   2) Chemoprevention with Tamoxifen, Raloxifene or Exemestane. These may reduce risk up to 50%. I reviewed that these particular medications are not without risks and the risk/benefit ratio must be considered carefully. She is not interested in this currently.   3) Risk reducing surgery such as prophylactic mastectomy  which may reduce risk by 90-95%. We discussed that this is a relatively radical strategy and is generally reserved for individuals with a known genetic mutation predisposing them to an increased risk (~50% risk) of breast cancer.    4) I discussed the importance of exercise and weight management as part of a risk reducing strategy, since increased BMI is associated with an increased risk of breast cancer. This is especially true in her case, as I expect her risk would decrease as she lost weight.     She is a current every day smoker, 1 PPD.  And is aware of the adverse effects from this.  I advised her to work on decreasing or ceasing smoking altogether if possible.      She is working on diet and exercise changes for weight loss.    Plan:  - breast MRI in 6 months at [facility] followed by exam    She is interested in increased surveillance given her increased risk, is not  interested in chemoprevention at this time.      I have advised the patient to continue monthly breast self examination.  She was also advised to notify us if she develops new breast symptoms.       ROXIE Perez          CC:  Navin Judd MD    EMR Dragon/transcription disclaimer:  Dictated using Dragon dictation

## 2023-04-20 ENCOUNTER — TELEPHONE (OUTPATIENT)
Dept: SURGERY | Facility: CLINIC | Age: 38
End: 2023-04-20
Payer: COMMERCIAL

## 2023-04-20 NOTE — TELEPHONE ENCOUNTER
Caller: ASAD GREER   Relationship to Patient: SELF   Phone Number: [phone]    Reason for Call: PT IS SCHEDULED FOR BREAST MRI ON 04/26.   PT IS REQUESTING IF VINCENT SUNG CAN CALL HER IN [facility] DO TO BEING VERY CLAUSTROPHOBIC.    Pharmacy: [facility] - [place], [place] - [address] [place] [address] Rd - [phone]

## 2023-04-26 ENCOUNTER — HOSPITAL ENCOUNTER (OUTPATIENT)
Dept: MRI IMAGING | Facility: HOSPITAL | Age: 38
Discharge: HOME | End: 2023-04-26
Admitting: NURSE PRACTITIONER
Payer: COMMERCIAL

## 2023-04-26 DIAGNOSIS — Z80.3 FH: BREAST CANCER: ICD-10-CM

## 2023-04-26 DIAGNOSIS — Z91.89 INCREASED RISK OF BREAST CANCER: ICD-10-CM

## 2023-04-26 DIAGNOSIS — N60.12 FIBROCYSTIC BREAST CHANGES, BILATERAL: ICD-10-CM

## 2023-04-26 DIAGNOSIS — R92.2 BREAST DENSITY: ICD-10-CM

## 2023-04-26 DIAGNOSIS — N60.11 FIBROCYSTIC BREAST CHANGES, BILATERAL: ICD-10-CM

## 2023-04-26 PROCEDURE — 77049 MRI BREAST C-+ W/CAD BI: CPT

## 2023-04-26 PROCEDURE — 0 GADOBENATE DIMEGLUMINE 529 MG/ML SOLUTION: Performed by: NURSE PRACTITIONER

## 2023-04-26 PROCEDURE — A9577 INJ MULTIHANCE: HCPCS | Performed by: NURSE PRACTITIONER

## 2023-04-26 RX ADMIN — GADOBENATE DIMEGLUMINE 20 ML: 529 INJECTION, SOLUTION INTRAVENOUS at 15:10

## 2023-06-05 ENCOUNTER — TELEPHONE (OUTPATIENT)
Dept: SURGERY | Facility: CLINIC | Age: 38
End: 2023-06-05
Payer: COMMERCIAL

## 2023-11-02 ENCOUNTER — HOSPITAL ENCOUNTER (OUTPATIENT)
Dept: MAMMOGRAPHY | Facility: HOSPITAL | Age: 38
Discharge: HOME | End: 2023-11-02
Admitting: NURSE PRACTITIONER
Payer: COMMERCIAL

## 2023-11-02 DIAGNOSIS — Z12.31 ENCOUNTER FOR SCREENING MAMMOGRAM FOR MALIGNANT NEOPLASM OF BREAST: ICD-10-CM

## 2023-11-02 PROCEDURE — 77067 SCR MAMMO BI INCL CAD: CPT

## 2023-11-02 PROCEDURE — 77063 BREAST TOMOSYNTHESIS BI: CPT

## 2023-11-07 NOTE — PROGRESS NOTES
[facility]     Referring Provider:  Dr Reese     Chief complaint: breast mass     HPI: Ms. Namita Gabriel is a 35 yo woman, seen at the request of Dr Reese, for abnormal breast imaging and family history of breast cancer.    I personally reviewed her records and summarized her relevant breast history/imaging:  She has no personal history of breast procedures.       She has a family history of breast cancer in her mother diagnosed at age 47,  at age 50.  She denies any family history of breast or ovarian cancer.   Ms. Gabriel has had BRCA testing with negative results.  She is unsure of what testing was completed or when.      2020:  Clinic visit with Dr Reese  Seen in clinic by Dr. Duran for her annual exam with reports of a 4-day episode of severe left breast pain in the outer quadrant with a small amount of white nipple discharge.  The pain was severe for 4 days but had ceased by time of that visit.  Her prior mammogram had been completed in 2019 with no abnormalities noted.  At that visit with exam a 1 cm round mass was noted in the left breast upper outer quadrant.  Diagnostic imaging consisting of diagnostic mammogram with tomosynthesis and bilateral ultrasound was ordered.    2019:  Screening mammogram with tomosynthesis at [facility]  FINDINGS:  The breasts are heterogeneously dense, which may obscure small masses.  There has been no significant change over the comparison interval. No  mammographic evidence of developing malignancy. Recommend repeat  screening mammogram in one year.   COMPARISON IMPRESSION:   Benign screening mammogram, unchanged since 2017.   BI-RADS CATEGORY 2: Benign Findings    2020:bilateral diagnostic mammogram with tomosynthesis, bilateral US at [facility] [place]  FINDINGS:  The breasts are extremely dense, which lowers the sensitivity of mammography.   The right breast does not show any focal or suspicious abnormalities.    Ultrasound the right breast did not show any abnormalities. There is dense glandular tissue with the patient is feeling a lump in the area   In the left breast there is an oval faint density seen on the cc view only. This is in the posterior third of the breast. It measures about 6 mm in diameter. It is 8 cm behind the nipple. It is not visible on the MLO view. Spot compression views performed over this area seems to slightly dissipate. Ultrasound of the left breast at 3-4 o'clock was performed. A small hypoechoic nodule shadowing for bone lesion is seen or o'clock to be some additional nipple. Period nodule shadowing 2 mm  hypoechoic areas noted at 4:00, 5 cm from nipple.   IMPRESSION:  . No suspicious findings were identified in the right breast where the patient indicates oa palpable abnormality. This area was  evaluated by the technologist and myself. Dense fibrous tissue is present in this region on ultrasound. Continued clinical follow-up is recommended. No additional imaging follow-up is recommended.   In the left breast there is a posterior faint oval density which is only seen on the CC projection. Ultrasound of this region did not show any suspicious correlate. Ultrasound did show 2 indeterminate but likely benign hypoechoic areas measuring up to 4 mm in diameter. These are likely complex cysts.  This patient has very dense breasts and has a family history of breast cancer mother at age 47. Bilateral breast MRI screening examination should be considered.   Recommend 6 month follow-up left diagnostic mammogram and ultrasound. If the breast MRI is performed and is negative then this follow-up would not be necessary.   .BI-RADS Category 3      9/23/2020: Return clinic visit with Dr. Reese  She return for follow-up with reports that the breast pain had completely resolved.  She was unable to palpate the mass that she had previously noted.  Imaging findings were reviewed and MRI preferred by both patient and  Dr. Duran.    2/2/21:  Today she presents with no breast complaints.  She denies any breast lumps, pain, skin changes, or nipple discharge.   She reports that she is otherwise healthy.   She has experienced a recent weight gain and is a smoker.    9/21/21:  She returns today with no breast changes.      2/23/21, breast MRI BiRAds 4.  (see full report below)  3/2/21, left diagnostic mmamogram, left US, BiRAds 4 ( see full report below)  3/9/21: Left MRI biopsy, benign and concorant. (see full report below)    5/10/22:  She returns today for follow up with no breast changes, stable health    Bilateral diagnostic mammogram with tomosynthesis, bilateral breast US on 10/5/21 was stable, BiRads 2.  (see full report below)    Breast MRI on 4/25/22 was stable, biRAds 2.  (see full report below)    11/22/22  She returns today for follow up with no breast concerns or health changes.    Screening mammogram with tomosynthesis on 10/25/22 was stable, BiRAds 1.  (see full report below)    7/13/2023   Patient presenting to the office today for routine follow-up.  She has no new breast complaints or concerns today.  She had a MRI in April 2023 that resulted as BI-RADS 1.    11/9/2023 Interval History  Patient presenting to the office today for routine follow-up.  She has no new breast complaints or concerns today.  She had a screening mammogram on 11/2 that resulted as BI-RADS 2.    Review of Systems   Constitutional:  Positive for unexpected weight change.   HENT:  Negative.     Eyes: Negative.    Respiratory: Negative.     Cardiovascular: Negative.    Gastrointestinal:  Positive for constipation and diarrhea.   Endocrine: Negative.    Genitourinary: Negative.     Musculoskeletal:  Positive for arthralgias and back pain.   Skin: Negative.    Neurological: Negative.    Hematological: Negative.    Psychiatric/Behavioral:  The patient is nervous/anxious.        Medications:    Current Outpatient Medications:     cyclobenzaprine  "(FLEXERIL) 10 MG tablet, Take 1 tablet by mouth Daily As Needed for Muscle Spasms., Disp: , Rfl:     ibuprofen (ADVIL,MOTRIN) 200 MG tablet, Take 1 tablet by mouth Every 6 (Six) Hours As Needed for Mild Pain., Disp: , Rfl:     Allergies:  Allergies   Allergen Reactions    Sulfa Antibiotics Hives       Medical history:  Past Medical History:   Diagnosis Date    Chronic neck pain        Surgical History:  Past Surgical History:   Procedure Laterality Date    BREAST BIOPSY Left 2021    benign through MRI    CHOLECYSTECTOMY         Family History:  Family History   Problem Relation Age of Onset    Cancer Mother     Breast cancer Mother     Diabetes Father     Colon cancer Father 65    No Known Problems Sister     No Known Problems Brother     No Known Problems Son     No Known Problems Daughter     No Known Problems Maternal Grandmother     No Known Problems Maternal Grandfather     No Known Problems Paternal Grandmother     No Known Problems Paternal Grandfather     No Known Problems Cousin     Melanoma Paternal Uncle 55       Social History:   Social History     Socioeconomic History    Marital status:     Number of children: 2   Tobacco Use    Smoking status: Former     Packs/day: 1     Types: Cigarettes     Start date: 2023     Passive exposure: Current    Smokeless tobacco: Never   Substance and Sexual Activity    Alcohol use: Yes     Comment: rare    Drug use: No    Sexual activity: Yes     Partners: Male     Patient drinks 5 servings of caffeine per day.       GYNECOLOGIC HISTORY:   . P: 2. AB: 0.  Age at menarche: 14  Age at first childbirth: 26  Lactation/How lon month  Age at menopause: n/a  Total years of oral contraceptive use: 3  Total years of hormone replacement therapy: 0      Physical Exam  Vitals:    23 0856   BP: 132/86   Pulse: 84   SpO2: 98%      /86   Pulse 84   Ht 165.1 cm (65\")   Wt 77.1 kg (170 lb)   SpO2 98%   BMI 28.29 kg/m²     I reviewed physical " exam, no changes noted    ECOG 0 - Asymptomatic  General: NAD, well appearing  Psych: a&o x 3, normal mood and affect  MSK: normal gait, normal ROM in bilateral shoulders  Lymph nodes:  no cervical, supraclavicular or axillary lymphadenopathy  Breast: symmetric, slightly ptotic bilaterally with diffusely nodular tissue.  Right: No visible abnormalities on inspection while seated, with arms raised or hands on hips. No masses, skin changes, or nipple abnormalities.  Left:  No visible abnormalities on inspection while seated, with arms raised or hands on hips. No masses, skin changes, or nipple abnormalities.    IMAGIN2021:  Breast MRI at [facility]   FINDINGS:Scattered fibroglandular tissue is seen throughout both breasts. Moderate background parenchymal enhancement of both breasts is noted.  In the middle third of the left breast at the 6 o'clock position centered on the order of 7 cm from the nipple there is discontinuous borderline clumped enhancement that measures on the order of 3.9 cm in the anterior to posterior dimension, 1.5 cm in the superior to inferior dimension and 0.8 cm in the medial to lateral dimension. No mammographic  abnormality within this region is appreciated.   No other areas of abnormal enhancement or morphology are seen within the left breast. I see no evidence for abnormal left breast skin, nipple or chest wall enhancement and there is no evidence for left axillary or internal mammary chain adenopathy.   There are no areas of abnormal enhancement or morphology in the right breast. I see no evidence for abnormal right breast skin, nipple or chest wall enhancement and there is no evidence for right axillary or internal mammary chain adenopathy.     IMPRESSION:  1. Discontinuous borderline clumped enhancement is seen in the left breast at the 6 o'clock position in the middle third on the order of 7 cm from the nipple extending over approximately 3.9 cm in length.  Correlation with  an MR-guided left breast biopsy is recommended.  2. There are no findings suspicious for malignancy in the right breast.   BI-RADS CATEGORY 4: Suspicious abnormality.Biopsy should be considered.    3/2/2021:  Left diagnostic mammogram, left breast US at [facility]  MAMMOGRAM FINDINGS:  Breast parenchyma remains extremely dense, grading sensitivity of mammography. There is no new dominant nodule or mass in the left breast. No new suspicious clustered microcalcifications. The subtle oval nodular density in the posterior left breast on the CC view described on the prior study is not visible today and likely represented a summation artifact or prominent fibroglandular tissue. Ultrasound was performed for six-month follow-up of the probably benign complex cysts in the left breast described on the prior study.   ULTRASOUND FINDINGS:  The patient was initially scanned independently by the technologist and also rescanned in my presence.   Repeat imaging of the 4:00 position left breast was performed. The tiny complex cyst in the 4:00 position left breast is unchanged, measuring about 4 mm. It is located about 3 cm from the nipple. A second probable tiny complex cyst or mildly prominent duct at 4:00, 5 cm from the nipple on the prior study has no correlate today.     Intervening breast MRI at outside facility reportedly demonstrated an area of clumped discontinuous enhancement in the 6:00 position left breast for which MRI directed biopsy has been recommended. Real-time observation of the 6:00 position left breast with ultrasound is negative. There is also no mammographic correlate. Please refer to the breast MRI report for further details.     SUMMARY:  The probably benign complex cyst in the 4:00 position left breast is unchanged. Intervening breast MRI in this clock position on the left was negative as well. The patient is scheduled for MR directed biopsy of abnormal clumped enhancement 6:00 position left breast which  has no ultrasound or mammography correlate today. The patient should proceed with the planned biopsy. FINDINGS and recommendations were discussed with the patient.   IMPRESSION:  1. Stable probably benign complex cyst in the 4:00 position left breast.  2. Second probable tiny complex cyst at 4:00 on the prior ultrasound is not visible today.  3. Previously described oval nodular density in the posterior left breast on CC imaging has resolved.  4. Abnormal intervening breast MRI at outside facility. MR directed biopsy for abnormal clumped enhancement in the 6:00 position left breast has been recommended. Please see the outside MRI report 02/23/2021 for further details. Additional follow-up recommendations will be generated following the MR directed biopsy.   BI-RADS CATEGORY 4: Suspicious abnormality    3/9/2021:  Left MRI biopsy at [facility]  PROCEDURE: MRI of the left breast was performed using a dedicated breast coil and biopsy system. Multiplanar images of the breast were obtained before and after the administration of intravenous gadolinium.    The nonmass enhancement in the lower left breast was targeted via a medial approach. An 8 gauge Mammotome vacuum-assisted biopsy device  was then inserted and 14 core samples were obtained. Post-biopsy MRI images confirmed adequate sampling. A bowtie clip was deployed at the biopsy site.    The patient tolerated the procedure well with no immediate complications.   Post-procedural digital orthogonal mammographic views of the left breast demonstrate the bowtie clip at the expected location at the site of biopsy in the lower slightly inner middle left breast.     PATHOLOGY:   Final Diagnosis    1. Left Breast, 7:00 o'clock, MRI Guided Core Biopsies for Clumped Enhancement: ?  A. Benign breast parenchyma with foci of ductal hyperplasia, usual type.  B. No atypical hyperplasia, in-situ nor invasive carcinoma identified.    Electronically signed by Coretta Espinoza  MD on 3/10/2021 at 1355    IMPRESSION/RECOMMENDATIONS:  1. MRI guided biopsy of a 3.9 cm area of clumped nonmass enhancement in the lower left breast, marked by a bowtie clip. Pathology is benign and concordant with the imaging assessment.    2. Please refer to outside institution reports regarding follow-up for a probably benign mass described at 4:00 in the left breast.    10/5/2021:  Bilateral diagnostic mammogram with tomosynthesis, bilateral limited US at  [place]  MAMMOGRAM FINDINGS:  Breast parenchyma is heterogeneously dense, degrading sensitivity of mammography. The pattern is unchanged. There is a new biopsy clip in the lower inner quadrant central left breast related to intervening breast biopsy performed with MRI guidance that yielded benign results. There is no new dominant nodule or mass in either breast. No new suspicious  cluster of microcalcifications. There is heterogeneously dense breast tissue in the upper outer quadrant right breast. This appears stable to slightly more prominent over the comparison interval. This area was further evaluated with targeted ultrasound.  ULTRASOUND FINDINGS:  The patient was initially scanned independently by the technologist and also rescanned in my presence.     Right breast: Imaging of the upper outer quadrant right breast was performed for further evaluation of the asymmetrically prominent breast tissue. There is no cystic or solid mass or persistent shadowing abnormality. Imaging findings are concordant with mammography.  Left breast: Imaging of the 4:00 position left breast was performed to reassess stability of the probably benign complex cyst 3 cm from the nipple. Ultrasound demonstrates a stable complex cyst or mildly heterogeneous area of breast tissue. The appearance is unchanged over the comparison interval of greater than one year and most characteristic of a benign etiology under real-time surveillance.  SUMMARY:  Previously documented mildly  complex cyst or heterogeneous tissue in the 4:00 position left breast is unchanged over the past year, most characteristic of benign etiology.  Asymmetrically prominent breast tissue in the upper outer quadrant right breast, stable for technical factors. Absent new or worsening symptoms in either breast, return to annual screening recommended. FINDINGS and recommendations discussed with the patient.   IMPRESSION:  1. Benign bilateral diagnostic mammogram and targeted bilateral breast ultrasound  BI-RADS CATEGORY 2: Benign Findings.    4/25/2022:  Breast MRI at [facility]  FINDINGS: There is heterogeneous fibroglandular tissue. There is moderate background parenchymal enhancement.   RIGHT BREAST:    No suspicious enhancing mass or area of non-mass enhancement is identified.  The visualized axilla is within normal limits.    LEFT BREAST:    At 7:00 in the middle left breast, 7.4 cm posterior to the nipple, there is a focus of susceptibility from a biopsy clip. No suspicious enhancing mass or area of non-mass enhancement is identified. The visualized axilla is within normal limits.    EXTRAMAMMARY FINDINGS:   There are no abnormally enlarged internal mammary chain lymph nodes on either side.     In the peripheral right hepatic lobe, there is a 1.2 cm STIR hyperintense progressively enhancing lesion, which is not significantly changed from 2/23/2021 and likely reflects a hemangioma.    IMPRESSION AND RECOMMENDATION:   No MRI evidence of malignancy in either breast. Routine screening is recommended.   BI-RADS Category 2: Benign    10/25/2022: bilateral screening mammogram with tomosynthesis at  [place]  FINDINGS:  The breasts are heterogeneously dense, which may obscure small masses. Percutaneous biopsy clip marker along the 7:00 axis left breast. No significant change when compared with prior images. No mammographic evidence of malignancy. Recommend repeat screening mammogram in one year.  IMPRESSION:  Negative screening  mammogram.  BI-RADS CATEGORY 1: Negative    4/26/2023 bilateral breast MRI at [facility]  FINDINGS: There is heterogeneous fibroglandular tissue. There is mild  background parenchymal enhancement.  RIGHT BREAST:    No suspicious enhancing mass or area of non-mass enhancement is  identified.  The visualized axilla is within normal limits.   LEFT BREAST:    No suspicious enhancing mass or area of non-mass enhancement is  identified.  The visualized axilla is within normal limits.   EXTRAMAMMARY FINDINGS:   There are no pathologically enlarged internal mammary chain lymph nodes  on either side.     A 1.2 cm T2 hyperintense right hepatic lesion is not significantly  changed.  IMPRESSION AND RECOMMENDATION:  No MRI evidence of malignancy in either breast. Recommend annual  screening mammogram in October 2023 and screening breast MRI in one  year.  BI-RADS Category 1: Negative    11/2/2023 bilateral screening mammogram at [facility]  FINDINGS:  The breasts are heterogeneously dense, which may obscure small masses.  Small focal asymmetric opacity in the deep medial right breast on the CC  image is unchanged since 2020 and should be benign. Percutaneous biopsy  clip marker in the lower medial left breast. No significant change when  compared with prior images. No mammographic evidence of malignancy.  Recommend repeat screening mammogram in one year.  IMPRESSION:  Benign screening mammogram.  BI-RADS CATEGORY 2: Benign Findings.      Assessment:    1)  38 y.o. F with benign left breast MRI biopsy 3/21  2)  abnormal left breast imaging for which surveillance is recommended, 8/2020, stable 10/2021 with routine follow up  3)  Benign breast changes  4)  Breast density  5)  Family history of breast cancer, lifetime risk per tyrer cuzick model is 20.6%  6)  Tobacco dependence, currently 1 PPD      Discussion:  Her risk for breast cancer based on her family history was calculated at her initial visit and found to be a lifetime risk of 20.6% per  Camachoer Kellenzick model and 17.6% lifetime risk per Sloane model her 5-year risk for Sloane model is 0.6%.  We discussed that greater than 20% lifetime risk is considered high risk.  A copy of NCCN high risk guidelines was given and reviewed.  We discussed management options for individuals who are at increased risk (>20% lifetime risk):   1) High risk screening - Annual mammogram and annual breast MRI, alternating one test every 6 months, biannual clinical exam and monthly self breast exam. She meets criteria for high risk screening.   2) Chemoprevention with Tamoxifen, Raloxifene or Exemestane. These may reduce risk up to 50%. I reviewed that these particular medications are not without risks and the risk/benefit ratio must be considered carefully. She is not interested in this currently.   3) Risk reducing surgery such as prophylactic mastectomy  which may reduce risk by 90-95%. We discussed that this is a relatively radical strategy and is generally reserved for individuals with a known genetic mutation predisposing them to an increased risk (~50% risk) of breast cancer.    4) I discussed the importance of exercise and weight management as part of a risk reducing strategy, since increased BMI is associated with an increased risk of breast cancer. This is especially true in her case, as I expect her risk would decrease as she lost weight.     She is a current every day smoker, 1 PPD.  And is aware of the adverse effects from this.  I advised her to work on decreasing or ceasing smoking altogether if possible.      She is working on diet and exercise changes for weight loss.    Plan:  -breast mri in April followed by exam    She is interested in increased surveillance given her increased risk, is not interested in chemoprevention at this time.      I have advised the patient to continue monthly breast self examination.  She was also advised to notify us if she develops new breast symptoms.       Luis Ray,  APRN          CC:  Dr Mary Ann Ly, Navin CASANOVA MD    EMR Tiffany/transcription disclaimer:  Dictated using Dragon dictation

## 2023-11-08 ENCOUNTER — TELEPHONE (OUTPATIENT)
Dept: SURGERY | Facility: CLINIC | Age: 38
End: 2023-11-08
Payer: COMMERCIAL

## 2023-11-09 ENCOUNTER — TELEPHONE (OUTPATIENT)
Dept: SURGERY | Facility: CLINIC | Age: 38
End: 2023-11-09
Payer: COMMERCIAL

## 2023-11-09 ENCOUNTER — OFFICE VISIT (OUTPATIENT)
Dept: SURGERY | Facility: CLINIC | Age: 38
End: 2023-11-09
Payer: COMMERCIAL

## 2023-11-09 VITALS
HEART RATE: 84 BPM | OXYGEN SATURATION: 98 % | HEIGHT: 65 IN | WEIGHT: 170 LBS | BODY MASS INDEX: 28.32 KG/M2 | DIASTOLIC BLOOD PRESSURE: 86 MMHG | SYSTOLIC BLOOD PRESSURE: 132 MMHG

## 2023-11-09 DIAGNOSIS — Z91.89 INCREASED RISK OF BREAST CANCER: ICD-10-CM

## 2023-11-09 DIAGNOSIS — N60.11 FIBROCYSTIC BREAST CHANGES, BILATERAL: ICD-10-CM

## 2023-11-09 DIAGNOSIS — Z80.3 FH: BREAST CANCER: Primary | ICD-10-CM

## 2023-11-09 DIAGNOSIS — R92.30 BREAST DENSITY: ICD-10-CM

## 2023-11-09 DIAGNOSIS — N60.12 FIBROCYSTIC BREAST CHANGES, BILATERAL: ICD-10-CM

## 2023-11-09 PROCEDURE — 99213 OFFICE O/P EST LOW 20 MIN: CPT | Performed by: NURSE PRACTITIONER

## 2023-11-09 NOTE — TELEPHONE ENCOUNTER
Spoke to pt and let her knowq I scheduled her at the [place] location for her breast Mri on 04/10 @ 10am be there at 930     Pt stated she will need medicine for the MRI I told her to call us when it gets closer to that time and I would be happy to call it in       I told her that her fu with jennifer paris is on 04/19 @ 920   Pt stated understanding

## 2023-12-19 ENCOUNTER — OFFICE VISIT (OUTPATIENT)
Dept: OBSTETRICS AND GYNECOLOGY | Facility: CLINIC | Age: 38
End: 2023-12-19
Payer: COMMERCIAL

## 2023-12-19 VITALS
DIASTOLIC BLOOD PRESSURE: 64 MMHG | BODY MASS INDEX: 29.39 KG/M2 | WEIGHT: 176.4 LBS | HEIGHT: 65 IN | SYSTOLIC BLOOD PRESSURE: 108 MMHG

## 2023-12-19 DIAGNOSIS — F17.200 SMOKING: ICD-10-CM

## 2023-12-19 DIAGNOSIS — Z11.51 SCREENING FOR HUMAN PAPILLOMAVIRUS (HPV): ICD-10-CM

## 2023-12-19 DIAGNOSIS — Z01.419 ROUTINE GYNECOLOGICAL EXAMINATION: Primary | ICD-10-CM

## 2023-12-19 DIAGNOSIS — N90.89 SKIN TAG OF LABIA: ICD-10-CM

## 2023-12-19 LAB
B-HCG UR QL: NEGATIVE
BILIRUB BLD-MCNC: NEGATIVE MG/DL
CLARITY, POC: CLEAR
COLOR UR: YELLOW
EXPIRATION DATE: NORMAL
GLUCOSE UR STRIP-MCNC: NEGATIVE MG/DL
INTERNAL NEGATIVE CONTROL: NORMAL
INTERNAL POSITIVE CONTROL: NORMAL
KETONES UR QL: NEGATIVE
LEUKOCYTE EST, POC: NEGATIVE
Lab: NORMAL
NITRITE UR-MCNC: NEGATIVE MG/ML
PH UR: 5 [PH] (ref 5–8)
PROT UR STRIP-MCNC: NEGATIVE MG/DL
RBC # UR STRIP: NEGATIVE /UL
SP GR UR: 1 (ref 1–1.03)
UROBILINOGEN UR QL: NORMAL

## 2023-12-19 NOTE — PROGRESS NOTES
EVALUATION AND MANAGEMENT ENCOUNTER    S:  No chief complaint on file.      HPI:  Namita Gabriel is a 38 y.o.  with No LMP recorded. here for  ***.    Review of Systems:    ***Patient reports that she is not currently experiencing any symptoms of urinary incontinence.      ***TESTED FOR CHLAMYDIA?    .CESSATIONOPT    Vital Signs: There were no vitals taken for this visit.     Brief Urine Lab Results       None            Physical Exam      IMPRESSION:      ***    There are no diagnoses linked to this encounter.      PLAN:      ***    Pt instructed to call for results of any testing done today if she does not hear from us, and that failure to do so could result in inadequate treatment . Pt verbalized her understanding.     No follow-ups on file..  Instructions and precautions given.     {Time Spent (Optional):33022}     Cam Mckinney MD  10:14 EST   23

## 2023-12-19 NOTE — PROGRESS NOTES
GYN Annual Exam     CC- Here for annual exam   Chief Complaint   Patient presents with    Skin Problem     Skin tag of labia    Gynecologic Exam          Namita Gabriel is a 38 y.o.  female who presents for annual well woman exam. Patient's last menstrual period was 2023.    Problems in addition to need for annual: pt would like skin tag on L labium minus removed.  She has problems with it.     HPI: Skin Problem        PMHX:    * No active hospital problems. *  ; otherwise none    OB History          2    Para   2    Term   2            AB        Living   2         SAB        IAB        Ectopic        Molar        Multiple        Live Births   2                  Past Medical History:   Diagnosis Date    Chronic neck pain        Past Surgical History:   Procedure Laterality Date    BREAST BIOPSY Left 2021    benign through MRI    CHOLECYSTECTOMY           Current Outpatient Medications:     cyclobenzaprine (FLEXERIL) 10 MG tablet, Take 1 tablet by mouth Daily As Needed for Muscle Spasms., Disp: , Rfl:     ibuprofen (ADVIL,MOTRIN) 200 MG tablet, Take 1 tablet by mouth Every 6 (Six) Hours As Needed for Mild Pain., Disp: , Rfl:     Allergies   Allergen Reactions    Sulfa Antibiotics Hives       Social History     Tobacco Use    Smoking status: Former     Packs/day: 1     Types: Cigarettes     Start date: 2023     Passive exposure: Current    Smokeless tobacco: Never   Substance Use Topics    Alcohol use: Yes     Comment: rare    Drug use: No       Namita Gabriel  reports that she has quit smoking. Her smoking use included cigarettes. She started smoking about 6 weeks ago. She smoked an average of 1 pack per day. She has been exposed to tobacco smoke. She has never used smokeless tobacco..       Family History   Problem Relation Age of Onset    Cancer Mother     Breast cancer Mother     Diabetes Father     Colon cancer Father 65    No Known Problems Sister     No Known Problems Brother      "No Known Problems Son     No Known Problems Daughter     No Known Problems Maternal Grandmother     No Known Problems Maternal Grandfather     No Known Problems Paternal Grandmother     No Known Problems Paternal Grandfather     No Known Problems Cousin     Melanoma Paternal Uncle 55       Review of Systems   Constitutional: Negative.    HENT: Negative.     Eyes: Negative.    Respiratory: Negative.     Cardiovascular: Negative.    Gastrointestinal: Negative.    Endocrine: Negative.    Musculoskeletal: Negative.    Skin: Negative.    Allergic/Immunologic: Negative.    Neurological: Negative.    Hematological: Negative.    Psychiatric/Behavioral: Negative.         Patient reports that she is not currently experiencing any symptoms of urinary incontinence.      noTESTED FOR CHLAMYDIA?    EXAM:  /64   Ht 165.1 cm (65\")   Wt 80 kg (176 lb 6.4 oz)   LMP 11/26/2023   BMI 29.35 kg/m²     Labs:   Lab Results (last 24 hours)       Procedure Component Value Units Date/Time    POC Urinalysis Dipstick [[id number]] Collected: 12/19/23 1034    Specimen: Urine Updated: 12/19/23 1035     Color Yellow     Clarity, UA Clear     Glucose, UA Negative mg/dL      Bilirubin Negative     Ketones, UA Negative     Specific Gravity  1.005     Blood, UA Negative     pH, Urine 5.0     Protein, POC Negative mg/dL      Urobilinogen, UA Normal     Leukocytes Negative     Nitrite, UA Negative    POC Pregnancy, Urine [[id number]] Collected: 12/19/23 1034    Specimen: Urine Updated: 12/19/23 1034     HCG, Urine, QL Negative     Lot Number [id number]     Internal Positive Control Passed     Internal Negative Control Passed     Expiration Date 3/11/25            Physical Exam  Vitals and nursing note reviewed. Exam conducted with a chaperone present.   Constitutional:       General: She is not in acute distress.     Appearance: She is well-developed. She is not diaphoretic.   HENT:      Head: Normocephalic and atraumatic.      Nose: Nose normal. "   Eyes:      Extraocular Movements: Extraocular movements intact.   Cardiovascular:      Rate and Rhythm: Normal rate.   Pulmonary:      Effort: Pulmonary effort is normal.   Chest:   Breasts:     Breasts are symmetrical.      Right: Normal. No mass, nipple discharge, skin change or tenderness.      Left: Normal. No mass, nipple discharge, skin change or tenderness.   Abdominal:      General: There is no distension.      Palpations: Abdomen is soft. There is no mass.      Tenderness: There is no abdominal tenderness. There is no guarding.   Genitourinary:     General: Normal vulva.      Pubic Area: No rash.       Vagina: Normal. No vaginal discharge.      Cervix: Normal.      Uterus: Normal.       Adnexa: Right adnexa normal and left adnexa normal.          Comments: Skin tag as demonstrated removed and sent to pathology.  It was clamped and excised after prepping.  Silver nitrate applied as well as vaseline for hemostasis.   Musculoskeletal:         General: No tenderness or deformity. Normal range of motion.      Cervical back: Normal range of motion.   Lymphadenopathy:      Upper Body:      Right upper body: No axillary adenopathy.      Left upper body: No axillary adenopathy.   Skin:     General: Skin is warm and dry.      Coloration: Skin is not pale.      Findings: No erythema or rash.   Neurological:      Mental Status: She is alert and oriented to person, place, and time.   Psychiatric:         Behavior: Behavior normal.         Thought Content: Thought content normal.         Judgment: Judgment normal.            As part of wellness and prevention, the following topics were discussed with the patient: healthy weight, substance abuse/misuse, mental health, encouraging self breast exam, and other counseling and guidance done:  Nutrition, physical activity, healthy weight, injury prevention, misuse of tobacco, alcohol and drugs, sexual behavior and STDs, contraception, dental health, mental health,  immunizations breast cancer screening and exams.    Assessment     1) GYN annual well woman exam.   2) PAP done today? Yes  3) problems addressed: yes             Plan       Follow up prn or one year.    Pt instructed to call for results of any testing done today and that failure to call if she has not heard from us could result in inadequate treatment.  Pt verbalized her understanding.     Diagnoses and all orders for this visit:    1. Smoking (Primary)    2. Routine gynecological examination  -     POC Urinalysis Dipstick  -     POC Pregnancy, Urine    3. Skin tag of labia  -     Reference Histopathology        RTO Return in about 1 year (around 12/19/2024) for Annual physical.  I spent 15 minutes on the separately reported service of skin tag removal. This time is not included in the time used to support the E/M service also reported today.      Cam Mckinney MD  12/19/23  10:58 EST

## 2023-12-22 LAB
CYTOLOGIST CVX/VAG CYTO: NORMAL
CYTOLOGY CVX/VAG DOC CYTO: NORMAL
CYTOLOGY CVX/VAG DOC THIN PREP: NORMAL
DX ICD CODE: NORMAL
HIV 1 & 2 AB SER-IMP: NORMAL
HPV I/H RISK 4 DNA CVX QL PROBE+SIG AMP: NEGATIVE
OTHER STN SPEC: NORMAL
PATHOLOGIST CVX/VAG CYTO: NORMAL
STAT OF ADQ CVX/VAG CYTO-IMP: NORMAL

## 2023-12-27 LAB
DX ICD CODE: NORMAL
PATH REPORT.FINAL DX SPEC: NORMAL
PATH REPORT.GROSS SPEC: NORMAL
PATH REPORT.SITE OF ORIGIN SPEC: NORMAL
PATHOLOGIST NAME: NORMAL
PAYMENT PROCEDURE: NORMAL

## 2024-04-15 ENCOUNTER — TELEPHONE (OUTPATIENT)
Dept: SURGERY | Facility: CLINIC | Age: 39
End: 2024-04-15
Payer: COMMERCIAL

## 2024-04-15 DIAGNOSIS — F41.9 ANXIETY: Primary | ICD-10-CM

## 2024-04-15 RX ORDER — DIAZEPAM 10 MG/1
10 TABLET ORAL ONCE
Qty: 1 TABLET | Refills: 0 | Status: SHIPPED | OUTPATIENT
Start: 2024-04-15 | End: 2024-04-15

## 2024-04-15 NOTE — TELEPHONE ENCOUNTER
----- Message from ROXIE Baxter sent at 4/15/2024 12:19 PM EDT -----  Regarding: RE: valium for breast MRI 04/29  sent  ----- Message -----  From: Didi Lugo MA  Sent: 4/15/2024  11:10 AM EDT  To: ROXIE Baxter  Subject: FW: valium for breast MRI 04/29                    ----- Message -----  From: Luis Ray APRN  Sent: 4/15/2024  10:37 AM EDT  To: Didi Lugo MA  Subject: RE: valium for breast MRI 04/29                  We are no longer giving this rhonda per radiology request.  Radiology however is willing to give med.  May want to find out their process and let the pt know  ----- Message -----  From: Didi Lugo MA  Sent: 4/15/2024  10:12 AM EDT  To: ROXIE Baxter  Subject: valium for breast MRI 04/29                      Hey   Pt requested medication for breast MRI on 04/29 I did tell her she will need a  for there and back   Pt stated understanding and I documented that in her chart

## 2024-04-15 NOTE — TELEPHONE ENCOUNTER
Spoke to pt and got her rescheduled for her breast MRI in [facility] on 04/29 @ 9am and then rescheduled her appt with jennifer paris to after that on 05/06 @ 840     Pt stated understanding     Pt requested medication for the breast MRI I told her I would send a message to ROXIE Baxter     Pt also aware to have  for there and back when taking the medication     Pt stated understanding to all of this

## 2024-05-15 ENCOUNTER — HOSPITAL ENCOUNTER (OUTPATIENT)
Dept: MRI IMAGING | Facility: HOSPITAL | Age: 39
Discharge: HOME | End: 2024-05-15
Admitting: NURSE PRACTITIONER
Payer: COMMERCIAL

## 2024-05-15 DIAGNOSIS — Z91.89 INCREASED RISK OF BREAST CANCER: ICD-10-CM

## 2024-05-15 PROCEDURE — 0 GADOBENATE DIMEGLUMINE 529 MG/ML SOLUTION: Performed by: NURSE PRACTITIONER

## 2024-05-15 PROCEDURE — 77049 MRI BREAST C-+ W/CAD BI: CPT

## 2024-05-15 PROCEDURE — A9577 INJ MULTIHANCE: HCPCS | Performed by: NURSE PRACTITIONER

## 2024-05-15 RX ADMIN — GADOBENATE DIMEGLUMINE 16 ML: 529 INJECTION, SOLUTION INTRAVENOUS at 14:11

## 2024-05-16 ENCOUNTER — TELEPHONE (OUTPATIENT)
Dept: SURGERY | Facility: CLINIC | Age: 39
End: 2024-05-16
Payer: COMMERCIAL

## 2024-05-16 DIAGNOSIS — R92.8 ABNORMAL FINDING ON BREAST IMAGING: Primary | ICD-10-CM

## 2024-05-16 NOTE — TELEPHONE ENCOUNTER
Spoke to pt and she has read and gone over her MRI results on mychart   Pt said she doesn't have to go over it with luis since she read it   I got the ok from Luis and am going to put the order in for a left Mri guided Bx and get that scheduled for the pt and move appt with luis paris to after that

## 2024-06-04 ENCOUNTER — TELEPHONE (OUTPATIENT)
Dept: SURGERY | Facility: CLINIC | Age: 39
End: 2024-06-04
Payer: COMMERCIAL

## 2024-06-04 NOTE — TELEPHONE ENCOUNTER
Lvm for pt that provider will not be in at her [facility] appt time with jennifer paris on 06/14 !@ 820 I was wondering if she can come in at 1240 instead that day

## 2024-06-06 NOTE — PROGRESS NOTES
06/11/24 0001   Pre-Procedure Phone Call   Procedure Time Verified Yes   Arrival Time 0845   Procedure Location Verified Yes   Medical History Reviewed Yes   NPO Status Reinforced No   Ride and Caregiver Arranged Yes   Phone Number for Ride/Caregiver Mother-in-law   Patient Knows to Bring Current Medications No   Bring Outside Films Requested No

## 2024-06-11 ENCOUNTER — HOSPITAL ENCOUNTER (OUTPATIENT)
Dept: MRI IMAGING | Facility: HOSPITAL | Age: 39
Discharge: HOME | End: 2024-06-11
Payer: COMMERCIAL

## 2024-06-11 ENCOUNTER — HOSPITAL ENCOUNTER (OUTPATIENT)
Dept: MAMMOGRAPHY | Facility: HOSPITAL | Age: 39
Discharge: HOME | End: 2024-06-11
Payer: COMMERCIAL

## 2024-06-11 VITALS
SYSTOLIC BLOOD PRESSURE: 112 MMHG | RESPIRATION RATE: 16 BRPM | DIASTOLIC BLOOD PRESSURE: 74 MMHG | HEIGHT: 66 IN | HEART RATE: 59 BPM | WEIGHT: 175 LBS | OXYGEN SATURATION: 100 % | TEMPERATURE: 98.6 F | BODY MASS INDEX: 28.12 KG/M2

## 2024-06-11 DIAGNOSIS — R92.8 ABNORMAL FINDING ON BREAST IMAGING: ICD-10-CM

## 2024-06-11 LAB — CREAT BLDA-MCNC: 0.9 MG/DL (ref 0.6–1.3)

## 2024-06-11 PROCEDURE — 88305 TISSUE EXAM BY PATHOLOGIST: CPT | Performed by: NURSE PRACTITIONER

## 2024-06-11 PROCEDURE — A9577 INJ MULTIHANCE: HCPCS | Performed by: NURSE PRACTITIONER

## 2024-06-11 PROCEDURE — 0 GADOBENATE DIMEGLUMINE 529 MG/ML SOLUTION: Performed by: NURSE PRACTITIONER

## 2024-06-11 PROCEDURE — 25010000002 LIDOCAINE 1 % SOLUTION: Performed by: NURSE PRACTITIONER

## 2024-06-11 PROCEDURE — C1894 INTRO/SHEATH, NON-LASER: HCPCS

## 2024-06-11 PROCEDURE — A4648 IMPLANTABLE TISSUE MARKER: HCPCS

## 2024-06-11 PROCEDURE — 77065 DX MAMMO INCL CAD UNI: CPT

## 2024-06-11 PROCEDURE — 82565 ASSAY OF CREATININE: CPT

## 2024-06-11 RX ORDER — DIAZEPAM 5 MG/1
5 TABLET ORAL ONCE AS NEEDED
Status: COMPLETED | OUTPATIENT
Start: 2024-06-11 | End: 2024-06-11

## 2024-06-11 RX ORDER — LIDOCAINE HYDROCHLORIDE 10 MG/ML
1 INJECTION, SOLUTION INFILTRATION; PERINEURAL ONCE
Status: COMPLETED | OUTPATIENT
Start: 2024-06-11 | End: 2024-06-11

## 2024-06-11 RX ADMIN — DIAZEPAM 5 MG: 5 TABLET ORAL at 10:32

## 2024-06-11 RX ADMIN — GADOBENATE DIMEGLUMINE 16 ML: 529 INJECTION, SOLUTION INTRAVENOUS at 10:58

## 2024-06-11 RX ADMIN — DIAZEPAM 5 MG: 5 TABLET ORAL at 09:42

## 2024-06-11 RX ADMIN — LIDOCAINE HYDROCHLORIDE 15 ML: 10; .005 INJECTION, SOLUTION EPIDURAL; INFILTRATION; INTRACAUDAL; PERINEURAL at 11:19

## 2024-06-11 RX ADMIN — LIDOCAINE HYDROCHLORIDE 1 ML: 10 INJECTION, SOLUTION INFILTRATION; PERINEURAL at 11:19

## 2024-06-11 NOTE — NURSING NOTE
Biopsy site to left lower inner breast clear with Dermabond dry and intact. No firmness or swelling noted at or around biopsy site. Denies pain at breast. Ice pack with protective covering applied to biopsy site. Discharge instructions discussed with understanding voiced by patient. Copies provided to patient. No distress noted. This RN wheeled patient to Patient Discharge to meet mother in law with car.

## 2024-06-12 ENCOUNTER — TELEPHONE (OUTPATIENT)
Dept: MAMMOGRAPHY | Facility: HOSPITAL | Age: 39
End: 2024-06-12
Payer: COMMERCIAL

## 2024-06-12 LAB
LAB AP CASE REPORT: NORMAL
PATH REPORT.FINAL DX SPEC: NORMAL
PATH REPORT.GROSS SPEC: NORMAL

## 2024-06-12 NOTE — TELEPHONE ENCOUNTER
"Post call complete checking on patient after procedure yesterday. Patient states, \"I am doing okay just a little sore.\" Following aftercare. No questions. Appreciated call and care provided yesterday.   "

## 2024-06-13 ENCOUNTER — TELEPHONE (OUTPATIENT)
Dept: SURGERY | Facility: CLINIC | Age: 39
End: 2024-06-13
Payer: COMMERCIAL

## 2024-06-13 NOTE — TELEPHONE ENCOUNTER
Spoke to pt and let her know that her pathology came back benign and Luis will go over the results more in detail tomorrow at their scheduled appointment  Pt stated understanding

## 2024-06-13 NOTE — PROGRESS NOTES
[facility]     Referring Provider:  Dr Reese     Chief complaint:  High risk     HPI: Ms. Namita Gabriel is a 37 yo woman, seen at the request of Dr Reese, for abnormal breast imaging and family history of breast cancer.    I personally reviewed her records and summarized her relevant breast history/imaging:  She has no personal history of breast procedures.       She has a family history of breast cancer in her mother diagnosed at age 47,  at age 50.  She denies any family history of breast or ovarian cancer.   Ms. Gabriel has had BRCA testing with negative results.  She is unsure of what testing was completed or when.      2020:  Clinic visit with Dr Reese  Seen in clinic by Dr. Duran for her annual exam with reports of a 4-day episode of severe left breast pain in the outer quadrant with a small amount of white nipple discharge.  The pain was severe for 4 days but had ceased by time of that visit.  Her prior mammogram had been completed in 2019 with no abnormalities noted.  At that visit with exam a 1 cm round mass was noted in the left breast upper outer quadrant.  Diagnostic imaging consisting of diagnostic mammogram with tomosynthesis and bilateral ultrasound was ordered.    2019:  Screening mammogram with tomosynthesis at [facility]  FINDINGS:  The breasts are heterogeneously dense, which may obscure small masses.  There has been no significant change over the comparison interval. No  mammographic evidence of developing malignancy. Recommend repeat  screening mammogram in one year.   COMPARISON IMPRESSION:   Benign screening mammogram, unchanged since 2017.   BI-RADS CATEGORY 2: Benign Findings    2020:bilateral diagnostic mammogram with tomosynthesis, bilateral US at [facility] [place]  FINDINGS:  The breasts are extremely dense, which lowers the sensitivity of mammography.   The right breast does not show any focal or suspicious abnormalities.   Ultrasound  the right breast did not show any abnormalities. There is dense glandular tissue with the patient is feeling a lump in the area   In the left breast there is an oval faint density seen on the cc view only. This is in the posterior third of the breast. It measures about 6 mm in diameter. It is 8 cm behind the nipple. It is not visible on the MLO view. Spot compression views performed over this area seems to slightly dissipate. Ultrasound of the left breast at 3-4 o'clock was performed. A small hypoechoic nodule shadowing for bone lesion is seen or o'clock to be some additional nipple. Period nodule shadowing 2 mm  hypoechoic areas noted at 4:00, 5 cm from nipple.   IMPRESSION:  . No suspicious findings were identified in the right breast where the patient indicates oa palpable abnormality. This area was  evaluated by the technologist and myself. Dense fibrous tissue is present in this region on ultrasound. Continued clinical follow-up is recommended. No additional imaging follow-up is recommended.   In the left breast there is a posterior faint oval density which is only seen on the CC projection. Ultrasound of this region did not show any suspicious correlate. Ultrasound did show 2 indeterminate but likely benign hypoechoic areas measuring up to 4 mm in diameter. These are likely complex cysts.  This patient has very dense breasts and has a family history of breast cancer mother at age 47. Bilateral breast MRI screening examination should be considered.   Recommend 6 month follow-up left diagnostic mammogram and ultrasound. If the breast MRI is performed and is negative then this follow-up would not be necessary.   .BI-RADS Category 3      9/23/2020: Return clinic visit with Dr. Reese  She return for follow-up with reports that the breast pain had completely resolved.  She was unable to palpate the mass that she had previously noted.  Imaging findings were reviewed and MRI preferred by both patient and   Renee.    2/2/21:  Today she presents with no breast complaints.  She denies any breast lumps, pain, skin changes, or nipple discharge.   She reports that she is otherwise healthy.   She has experienced a recent weight gain and is a smoker.    9/21/21:  She returns today with no breast changes.      2/23/21, breast MRI BiRAds 4.  (see full report below)  3/2/21, left diagnostic mmamogram, left US, BiRAds 4 ( see full report below)  3/9/21: Left MRI biopsy, benign and concorant. (see full report below)    5/10/22:  She returns today for follow up with no breast changes, stable health    Bilateral diagnostic mammogram with tomosynthesis, bilateral breast US on 10/5/21 was stable, BiRads 2.  (see full report below)    Breast MRI on 4/25/22 was stable, biRAds 2.  (see full report below)    11/22/22  She returns today for follow up with no breast concerns or health changes.    Screening mammogram with tomosynthesis on 10/25/22 was stable, BiRAds 1.  (see full report below)    7/13/2023   Patient presenting to the office today for routine follow-up.  She has no new breast complaints or concerns today.  She had a MRI in April 2023 that resulted as BI-RADS 1.    11/9/2023   Patient presenting to the office today for routine follow-up.  She has no new breast complaints or concerns today.  She had a screening mammogram on 11/2 that resulted as BI-RADS 2.    6/14/2024 interval history  Patient presenting to the office today for routine follow-up.  On 5/15/2024 she had bilateral diagnostic MRI that resulted as BI-RADS 4 and suggested MRI guided left breast biopsy for a 2.4 cm non-mass enhancement.  Fortunately this was a benign results of nodular sclerosing adenosis and fibroadenomatoid hyperplasia.  It suggested she go back to annual screening mammogram which is due in November.  She has no new breast complaints or concerns today.    Review of Systems   Constitutional:  Positive for unexpected weight change.   HENT:   Negative.     Eyes: Negative.    Respiratory: Negative.     Cardiovascular: Negative.    Gastrointestinal:  Positive for constipation and diarrhea.   Endocrine: Negative.    Genitourinary: Negative.     Musculoskeletal:  Positive for arthralgias and back pain.   Skin: Negative.    Neurological: Negative.    Hematological: Negative.    Psychiatric/Behavioral:  The patient is nervous/anxious.        Medications:    Current Outpatient Medications:     cyclobenzaprine (FLEXERIL) 10 MG tablet, Take 1 tablet by mouth Daily As Needed for Muscle Spasms., Disp: , Rfl:     ibuprofen (ADVIL,MOTRIN) 200 MG tablet, Take 1 tablet by mouth Every 6 (Six) Hours As Needed for Mild Pain., Disp: , Rfl:     Allergies:  Allergies   Allergen Reactions    Sulfa Antibiotics Hives       Medical history:  Past Medical History:   Diagnosis Date    Chronic neck pain        Surgical History:  Past Surgical History:   Procedure Laterality Date    BREAST BIOPSY Left 03/2021    benign through MRI    CHOLECYSTECTOMY         Family History:  Family History   Problem Relation Age of Onset    Cancer Mother     Breast cancer Mother     Diabetes Father     Colon cancer Father 65    No Known Problems Sister     No Known Problems Brother     No Known Problems Son     No Known Problems Daughter     No Known Problems Maternal Grandmother     No Known Problems Maternal Grandfather     No Known Problems Paternal Grandmother     No Known Problems Paternal Grandfather     No Known Problems Cousin     Melanoma Paternal Uncle 55       Social History:   Social History     Socioeconomic History    Marital status:     Number of children: 2   Tobacco Use    Smoking status: Former     Current packs/day: 1.00     Average packs/day: 1 pack/day for 0.6 years (0.6 ttl pk-yrs)     Types: Cigarettes     Start date: 11/1/2023     Passive exposure: Current    Smokeless tobacco: Never   Substance and Sexual Activity    Alcohol use: Yes     Comment: rare    Drug use: No     Sexual activity: Yes     Partners: Male     Patient drinks 5 servings of caffeine per day.       GYNECOLOGIC HISTORY:   . P: 2. AB: 0.  Age at menarche: 14  Age at first childbirth: 26  Lactation/How lon month  Age at menopause: n/a  Total years of oral contraceptive use: 3  Total years of hormone replacement therapy: 0      Physical Exam  There were no vitals filed for this visit.     There were no vitals taken for this visit.    I reviewed physical exam, no changes noted    ECOG 0 - Asymptomatic  General: NAD, well appearing  Psych: a&o x 3, normal mood and affect  MSK: normal gait, normal ROM in bilateral shoulders  Lymph nodes:  no cervical, supraclavicular or axillary lymphadenopathy  Breast: symmetric, slightly ptotic bilaterally with diffusely nodular tissue.  Right: No visible abnormalities on inspection while seated, with arms raised or hands on hips. No masses, skin changes, or nipple abnormalities.  Left:  No visible abnormalities on inspection while seated, with arms raised or hands on hips. No masses, skin changes, or nipple abnormalities.    IMAGIN2021:  Breast MRI at [facility]   FINDINGS:Scattered fibroglandular tissue is seen throughout both breasts. Moderate background parenchymal enhancement of both breasts is noted.  In the middle third of the left breast at the 6 o'clock position centered on the order of 7 cm from the nipple there is discontinuous borderline clumped enhancement that measures on the order of 3.9 cm in the anterior to posterior dimension, 1.5 cm in the superior to inferior dimension and 0.8 cm in the medial to lateral dimension. No mammographic  abnormality within this region is appreciated.   No other areas of abnormal enhancement or morphology are seen within the left breast. I see no evidence for abnormal left breast skin, nipple or chest wall enhancement and there is no evidence for left axillary or internal mammary chain adenopathy.   There are no  areas of abnormal enhancement or morphology in the right breast. I see no evidence for abnormal right breast skin, nipple or chest wall enhancement and there is no evidence for right axillary or internal mammary chain adenopathy.     IMPRESSION:  1. Discontinuous borderline clumped enhancement is seen in the left breast at the 6 o'clock position in the middle third on the order of 7 cm from the nipple extending over approximately 3.9 cm in length.  Correlation with an MR-guided left breast biopsy is recommended.  2. There are no findings suspicious for malignancy in the right breast.   BI-RADS CATEGORY 4: Suspicious abnormality.Biopsy should be considered.    3/2/2021:  Left diagnostic mammogram, left breast US at [facility]  MAMMOGRAM FINDINGS:  Breast parenchyma remains extremely dense, grading sensitivity of mammography. There is no new dominant nodule or mass in the left breast. No new suspicious clustered microcalcifications. The subtle oval nodular density in the posterior left breast on the CC view described on the prior study is not visible today and likely represented a summation artifact or prominent fibroglandular tissue. Ultrasound was performed for six-month follow-up of the probably benign complex cysts in the left breast described on the prior study.   ULTRASOUND FINDINGS:  The patient was initially scanned independently by the technologist and also rescanned in my presence.   Repeat imaging of the 4:00 position left breast was performed. The tiny complex cyst in the 4:00 position left breast is unchanged, measuring about 4 mm. It is located about 3 cm from the nipple. A second probable tiny complex cyst or mildly prominent duct at 4:00, 5 cm from the nipple on the prior study has no correlate today.     Intervening breast MRI at outside facility reportedly demonstrated an area of clumped discontinuous enhancement in the 6:00 position left breast for which MRI directed biopsy has been recommended.  Real-time observation of the 6:00 position left breast with ultrasound is negative. There is also no mammographic correlate. Please refer to the breast MRI report for further details.     SUMMARY:  The probably benign complex cyst in the 4:00 position left breast is unchanged. Intervening breast MRI in this clock position on the left was negative as well. The patient is scheduled for MR directed biopsy of abnormal clumped enhancement 6:00 position left breast which has no ultrasound or mammography correlate today. The patient should proceed with the planned biopsy. FINDINGS and recommendations were discussed with the patient.   IMPRESSION:  1. Stable probably benign complex cyst in the 4:00 position left breast.  2. Second probable tiny complex cyst at 4:00 on the prior ultrasound is not visible today.  3. Previously described oval nodular density in the posterior left breast on CC imaging has resolved.  4. Abnormal intervening breast MRI at outside facility. MR directed biopsy for abnormal clumped enhancement in the 6:00 position left breast has been recommended. Please see the outside MRI report 02/23/2021 for further details. Additional follow-up recommendations will be generated following the MR directed biopsy.   BI-RADS CATEGORY 4: Suspicious abnormality    3/9/2021:  Left MRI biopsy at [facility]  PROCEDURE: MRI of the left breast was performed using a dedicated breast coil and biopsy system. Multiplanar images of the breast were obtained before and after the administration of intravenous gadolinium.    The nonmass enhancement in the lower left breast was targeted via a medial approach. An 8 gauge Mammotome vacuum-assisted biopsy device  was then inserted and 14 core samples were obtained. Post-biopsy MRI images confirmed adequate sampling. A bowtie clip was deployed at the biopsy site.    The patient tolerated the procedure well with no immediate complications.   Post-procedural digital orthogonal  mammographic views of the left breast demonstrate the bowtie clip at the expected location at the site of biopsy in the lower slightly inner middle left breast.     PATHOLOGY:   Final Diagnosis    1. Left Breast, 7:00 o'clock, MRI Guided Core Biopsies for Clumped Enhancement: ?  A. Benign breast parenchyma with foci of ductal hyperplasia, usual type.  B. No atypical hyperplasia, in-situ nor invasive carcinoma identified.    Electronically signed by Coretta Espinoza MD on 3/10/2021 at 1355    IMPRESSION/RECOMMENDATIONS:  1. MRI guided biopsy of a 3.9 cm area of clumped nonmass enhancement in the lower left breast, marked by a bowtie clip. Pathology is benign and concordant with the imaging assessment.    2. Please refer to outside institution reports regarding follow-up for a probably benign mass described at 4:00 in the left breast.    10/5/2021:  Bilateral diagnostic mammogram with tomosynthesis, bilateral limited US at  [place]  MAMMOGRAM FINDINGS:  Breast parenchyma is heterogeneously dense, degrading sensitivity of mammography. The pattern is unchanged. There is a new biopsy clip in the lower inner quadrant central left breast related to intervening breast biopsy performed with MRI guidance that yielded benign results. There is no new dominant nodule or mass in either breast. No new suspicious  cluster of microcalcifications. There is heterogeneously dense breast tissue in the upper outer quadrant right breast. This appears stable to slightly more prominent over the comparison interval. This area was further evaluated with targeted ultrasound.  ULTRASOUND FINDINGS:  The patient was initially scanned independently by the technologist and also rescanned in my presence.     Right breast: Imaging of the upper outer quadrant right breast was performed for further evaluation of the asymmetrically prominent breast tissue. There is no cystic or solid mass or persistent shadowing abnormality. Imaging findings  are concordant with mammography.  Left breast: Imaging of the 4:00 position left breast was performed to reassess stability of the probably benign complex cyst 3 cm from the nipple. Ultrasound demonstrates a stable complex cyst or mildly heterogeneous area of breast tissue. The appearance is unchanged over the comparison interval of greater than one year and most characteristic of a benign etiology under real-time surveillance.  SUMMARY:  Previously documented mildly complex cyst or heterogeneous tissue in the 4:00 position left breast is unchanged over the past year, most characteristic of benign etiology.  Asymmetrically prominent breast tissue in the upper outer quadrant right breast, stable for technical factors. Absent new or worsening symptoms in either breast, return to annual screening recommended. FINDINGS and recommendations discussed with the patient.   IMPRESSION:  1. Benign bilateral diagnostic mammogram and targeted bilateral breast ultrasound  BI-RADS CATEGORY 2: Benign Findings.    4/25/2022:  Breast MRI at [facility]  FINDINGS: There is heterogeneous fibroglandular tissue. There is moderate background parenchymal enhancement.   RIGHT BREAST:    No suspicious enhancing mass or area of non-mass enhancement is identified.  The visualized axilla is within normal limits.    LEFT BREAST:    At 7:00 in the middle left breast, 7.4 cm posterior to the nipple, there is a focus of susceptibility from a biopsy clip. No suspicious enhancing mass or area of non-mass enhancement is identified. The visualized axilla is within normal limits.    EXTRAMAMMARY FINDINGS:   There are no abnormally enlarged internal mammary chain lymph nodes on either side.     In the peripheral right hepatic lobe, there is a 1.2 cm STIR hyperintense progressively enhancing lesion, which is not significantly changed from 2/23/2021 and likely reflects a hemangioma.    IMPRESSION AND RECOMMENDATION:   No MRI evidence of malignancy in either  breast. Routine screening is recommended.   BI-RADS Category 2: Benign    10/25/2022: bilateral screening mammogram with tomosynthesis at  [place]  FINDINGS:  The breasts are heterogeneously dense, which may obscure small masses. Percutaneous biopsy clip marker along the 7:00 axis left breast. No significant change when compared with prior images. No mammographic evidence of malignancy. Recommend repeat screening mammogram in one year.  IMPRESSION:  Negative screening mammogram.  BI-RADS CATEGORY 1: Negative    4/26/2023 bilateral breast MRI at [facility]  FINDINGS: There is heterogeneous fibroglandular tissue. There is mild  background parenchymal enhancement.  RIGHT BREAST:    No suspicious enhancing mass or area of non-mass enhancement is  identified.  The visualized axilla is within normal limits.   LEFT BREAST:    No suspicious enhancing mass or area of non-mass enhancement is  identified.  The visualized axilla is within normal limits.   EXTRAMAMMARY FINDINGS:   There are no pathologically enlarged internal mammary chain lymph nodes  on either side.     A 1.2 cm T2 hyperintense right hepatic lesion is not significantly  changed.  IMPRESSION AND RECOMMENDATION:  No MRI evidence of malignancy in either breast. Recommend annual  screening mammogram in October 2023 and screening breast MRI in one  year.  BI-RADS Category 1: Negative    11/2/2023 bilateral screening mammogram at [facility]  FINDINGS:  The breasts are heterogeneously dense, which may obscure small masses.  Small focal asymmetric opacity in the deep medial right breast on the CC  image is unchanged since 2020 and should be benign. Percutaneous biopsy  clip marker in the lower medial left breast. No significant change when  compared with prior images. No mammographic evidence of malignancy.  Recommend repeat screening mammogram in one year.  IMPRESSION:  Benign screening mammogram.  BI-RADS CATEGORY 2: Benign Findings.    5/15/2024 bilateral breast MRI  [facility]  FINDINGS: Heterogenous fibroglandular tissue is seen throughout both  breasts. Moderate background parenchymal enhancement is noted.  In the middle third lower outer quadrant of the left breast centered at  the 7 o'clock position on the order of 4.5 cm from the nipple there is  an area of borderline clumped non-mass enhancement that measures on the  order of 2.4 cm in anterior to posterior dimension, 1.2 cm in the  superior-inferior dimension and 1.4 cm in the medial to lateral  dimension. No mammographic correlate is appreciated. The vicinity of a  focal signal void that represents a bowtie shaped metallic clip, but it  is more anterior and inferior than the previously biopsied location.  No other areas of abnormal enhancement or morphology are seen in the  left breast. I see no evidence for abnormal skin, nipple or chest wall  enhancement of the left breast and there is no evidence for left  axillary or internal mammary chain adenopathy.  There are no areas of abnormal enhancement or morphology in the right  breast. I see no evidence for abnormal skin, nipple or chest wall  enhancement and there is no evidence for right axillary or internal  mammary chain adenopathy.  IMPRESSION:  1. There is an area of non-mass enhancement in the left breast in the  anterior one third at the 7 o'clock position that measures 2.4 cm in  greatest dimension. No mammographic correlate is appreciated. Further  evaluation with an MRI guided left breast biopsy is recommended.  2. There are no findings suspicious for malignancy in the right breast.  BI-RADS category 4:    6/11/2024 left breast MRI guided biopsy at [facility]  PATHOLOGY:  Final Diagnosis  1.  Breast, left, 7:00, MRI, biopsy (stoplight clip marker): Benign  breast tissue with  -A.  Nodular sclerosing adenosis  -B.  Columnar cell change  -C.  Fibroadenomatoid hyperplasia  Electronically signed by Asad Koo MD on 6/12/2024 at 0979  IMPRESSION/RECOMMENDATIONS:  1. MRI  guided biopsy of 2.4 cm non-mass enhancement at 7:00 in the  middle left breast, marked by a stoplight clip. Pathology is benign and  concordant with the imaging assessment. Recommend annual screening  mammogram in November 2024.      Assessment:    1)  39 y.o. F with benign left breast MRI biopsy 3/21  2)  Benign breast changes  3)  Breast density  4)  Family history of breast cancer, lifetime risk per tyrer cuzick model is 20.6%  5)  Tobacco dependence, currently 1 PPD      Discussion:  Her risk for breast cancer based on her family history was calculated at her initial visit and found to be a lifetime risk of 20.6% per Tyer Cuzick model and 17.6% lifetime risk per Sloane model her 5-year risk for Sloane model is 0.6%.  We discussed that greater than 20% lifetime risk is considered high risk.  A copy of NCCN high risk guidelines was given and reviewed.  We discussed management options for individuals who are at increased risk (>20% lifetime risk):   1) High risk screening - Annual mammogram and annual breast MRI, alternating one test every 6 months, biannual clinical exam and monthly self breast exam. She meets criteria for high risk screening.   2) Chemoprevention with Tamoxifen, Raloxifene or Exemestane. These may reduce risk up to 50%. I reviewed that these particular medications are not without risks and the risk/benefit ratio must be considered carefully. She is not interested in this currently.   3) Risk reducing surgery such as prophylactic mastectomy  which may reduce risk by 90-95%. We discussed that this is a relatively radical strategy and is generally reserved for individuals with a known genetic mutation predisposing them to an increased risk (~50% risk) of breast cancer.    4) I discussed the importance of exercise and weight management as part of a risk reducing strategy, since increased BMI is associated with an increased risk of breast cancer. This is especially true in her case, as I expect her risk  would decrease as she lost weight.     She is a current every day smoker, 1 PPD.  And is aware of the adverse effects from this.  I advised her to work on decreasing or ceasing smoking altogether if possible.      She is working on diet and exercise changes for weight loss.    Plan:  Mammo in nov followed by exam    She is interested in increased surveillance given her increased risk, is not interested in chemoprevention at this time.      I have advised the patient to continue monthly breast self examination.  She was also advised to notify us if she develops new breast symptoms.       ROXIE Baxter          CC:  Dr Mary Ann Ly, Navin CASANOVA MD    [facility] Sam[employer]/transcription disclaimer:  Dictated using Dragon dictation

## 2024-06-14 ENCOUNTER — OFFICE VISIT (OUTPATIENT)
Dept: SURGERY | Facility: CLINIC | Age: 39
End: 2024-06-14
Payer: COMMERCIAL

## 2024-06-14 VITALS
WEIGHT: 179 LBS | HEIGHT: 66 IN | DIASTOLIC BLOOD PRESSURE: 80 MMHG | OXYGEN SATURATION: 98 % | HEART RATE: 92 BPM | SYSTOLIC BLOOD PRESSURE: 134 MMHG | BODY MASS INDEX: 28.77 KG/M2

## 2024-06-14 DIAGNOSIS — Z12.39 SCREENING BREAST EXAMINATION: ICD-10-CM

## 2024-06-14 DIAGNOSIS — Z91.89 INCREASED RISK OF BREAST CANCER: Primary | ICD-10-CM

## 2024-07-16 NOTE — PROGRESS NOTES
"EVALUATION AND MANAGEMENT ENCOUNTER    S:  Chief Complaint   Patient presents with    Pelvic Pain       HPI:  Namita Gabriel is a 39 y.o.  with Patient's last menstrual period was 2024. here for  f/u [facility] ER visit for pelvic pain.  Pt had u/s there that showed jimbo ovarian cysts.  Pt states she had had a lot of pelvic pain, but it is better now.  No other complaints.     Review of Systems   Constitutional: Negative.    HENT: Negative.     Eyes: Negative.    Respiratory: Negative.     Cardiovascular: Negative.    Gastrointestinal: Negative.    Endocrine: Negative.    Genitourinary:  Positive for pelvic pain.   Musculoskeletal: Negative.    Skin: Negative.    Allergic/Immunologic: Negative.    Neurological: Negative.    Hematological: Negative.    Psychiatric/Behavioral: Negative.     :    Patient reports that she is not currently experiencing any symptoms of urinary incontinence.      noTESTED FOR CHLAMYDIA?    .CESSATIONOPT    Vital Signs: /78   Ht 167.6 cm (65.98\")   Wt 81.2 kg (179 lb)   LMP 2024   BMI 28.91 kg/m²      Brief Urine Lab Results  (Last result in the past 365 days)        Color   Clarity   Blood   Leuk Est   Nitrite   Protein   CREAT   Urine HCG        24 1332               Negative               Physical Exam  Vitals and nursing note reviewed.   Constitutional:       Appearance: She is well-developed.   HENT:      Head: Normocephalic and atraumatic.   Cardiovascular:      Rate and Rhythm: Normal rate.   Pulmonary:      Effort: Pulmonary effort is normal.   Abdominal:      General: There is no distension.      Palpations: Abdomen is soft. There is no mass.      Tenderness: There is no abdominal tenderness. There is no guarding.   Genitourinary:     Vagina: No vaginal discharge.   Musculoskeletal:         General: No tenderness or deformity. Normal range of motion.      Cervical back: Normal range of motion.   Skin:     General: Skin is warm and dry.      " Coloration: Skin is not pale.      Findings: No erythema or rash.   Neurological:      Mental Status: She is alert and oriented to person, place, and time.   Psychiatric:         Behavior: Behavior normal.         Thought Content: Thought content normal.         Judgment: Judgment normal.       U/s wnl    IMPRESSION:      History of pelvic pain with ovarian cysts, resolved.     Assessment & Plan  Increased risk of breast cancer    FH: breast cancer    Increased body mass index (BMI)    Smoking    Pelvic pain      Orders Placed This Encounter   Procedures    POC Pregnancy, Urine    POC Urinalysis Dipstick                PLAN:      Expectant management.  Offered ovulation suppression; pt will consider.  Pt is smoker.     Pt instructed to call for results of any testing done today if she does not hear from us, and that failure to do so could result in inadequate treatment . Pt verbalized her understanding.     No follow-ups on file..  Instructions and precautions given.          Cam Mckinney MD  14:27 EDT   07/17/24

## 2024-07-17 ENCOUNTER — OFFICE VISIT (OUTPATIENT)
Dept: OBSTETRICS AND GYNECOLOGY | Facility: CLINIC | Age: 39
End: 2024-07-17
Payer: COMMERCIAL

## 2024-07-17 VITALS
SYSTOLIC BLOOD PRESSURE: 134 MMHG | DIASTOLIC BLOOD PRESSURE: 78 MMHG | HEIGHT: 66 IN | WEIGHT: 179 LBS | BODY MASS INDEX: 28.77 KG/M2

## 2024-07-17 DIAGNOSIS — Z91.89 INCREASED RISK OF BREAST CANCER: ICD-10-CM

## 2024-07-17 DIAGNOSIS — R10.2 PELVIC PAIN: Primary | ICD-10-CM

## 2024-07-17 DIAGNOSIS — F17.200 SMOKING: ICD-10-CM

## 2024-07-17 DIAGNOSIS — R63.8 INCREASED BODY MASS INDEX (BMI): ICD-10-CM

## 2024-07-17 DIAGNOSIS — Z80.3 FH: BREAST CANCER: ICD-10-CM

## 2024-07-17 LAB
B-HCG UR QL: NEGATIVE
BILIRUB BLD-MCNC: NEGATIVE MG/DL
CLARITY, POC: CLEAR
COLOR UR: YELLOW
EXPIRATION DATE: NORMAL
GLUCOSE UR STRIP-MCNC: NEGATIVE MG/DL
INTERNAL NEGATIVE CONTROL: NEGATIVE
INTERNAL POSITIVE CONTROL: POSITIVE
KETONES UR QL: NEGATIVE
LEUKOCYTE EST, POC: NEGATIVE
Lab: 55
NITRITE UR-MCNC: NEGATIVE MG/ML
PH UR: 5 [PH] (ref 5–8)
PROT UR STRIP-MCNC: NEGATIVE MG/DL
RBC # UR STRIP: NEGATIVE /UL
SP GR UR: 1 (ref 1–1.03)
UROBILINOGEN UR QL: NORMAL

## 2024-07-17 RX ORDER — NAPROXEN 375 MG/1
375 TABLET ORAL
COMMUNITY
Start: 2024-06-24

## 2024-12-24 NOTE — PROGRESS NOTES
GYN Annual Exam     CC- Here for annual exam   Chief Complaint   Patient presents with    Annual Exam          Namita Gabriel is a 39 y.o.  female who presents for annual well woman exam. Patient's last menstrual period was 2024.    Problems in addition to need for annual: none    HPI: History of Present Illness    PMHX:  Patient Active Problem List   Diagnosis    Abnormal mammogram    Fibrocystic breast changes, bilateral    Breast density    FH: breast cancer    Increased risk of breast cancer    Increased body mass index (BMI)    Smoking    Abnormal finding on breast imaging    Skin tag of labia    Pelvic pain   ; otherwise none    OB History          2    Para   2    Term   2            AB        Living   2         SAB        IAB        Ectopic        Molar        Multiple        Live Births   2                Past Medical History:   Diagnosis Date    Chronic neck pain        Past Surgical History:   Procedure Laterality Date    BREAST BIOPSY Left 2021    benign through MRI    CHOLECYSTECTOMY           Current Outpatient Medications:     cyclobenzaprine (FLEXERIL) 10 MG tablet, Take 1 tablet by mouth Daily As Needed for Muscle Spasms., Disp: , Rfl:     ibuprofen (ADVIL,MOTRIN) 200 MG tablet, Take 1 tablet by mouth Every 6 (Six) Hours As Needed for Mild Pain., Disp: , Rfl:     naproxen (NAPROSYN) 375 MG tablet, Take 1 tablet by mouth. (Patient not taking: Reported on 2024), Disp: , Rfl:     Allergies   Allergen Reactions    Sulfa Antibiotics Hives       Social History     Tobacco Use    Smoking status: Every Day     Current packs/day: 1.00     Average packs/day: 1 pack/day for 1.2 years (1.2 ttl pk-yrs)     Types: Cigarettes     Start date: 2023     Passive exposure: Current    Smokeless tobacco: Never   Substance Use Topics    Alcohol use: Yes     Comment: rare    Drug use: No       Namita Gabriel  reports that she has been smoking cigarettes. She started smoking about 13  Has she had any fever?  I will order a formal urinalysis we can get downstairs and if it looks like she has a UTI I will order her augmentin   "months ago. She has a 1.2 pack-year smoking history. She has been exposed to tobacco smoke. She has never used smokeless tobacco..       Family History   Problem Relation Age of Onset    Cancer Mother     Breast cancer Mother     Diabetes Father     Colon cancer Father 65    No Known Problems Sister     No Known Problems Brother     No Known Problems Son     No Known Problems Daughter     No Known Problems Maternal Grandmother     No Known Problems Maternal Grandfather     No Known Problems Paternal Grandmother     No Known Problems Paternal Grandfather     No Known Problems Cousin     Melanoma Paternal Uncle 55       Review of Systems    Patient reports that she is not currently experiencing any symptoms of urinary incontinence.      noTESTED FOR CHLAMYDIA?    Gardisil indicated? (9-46yo) 0,2,6 months: no    EXAM:  /88   Ht 167.6 cm (65.98\")   Wt 82 kg (180 lb 12.8 oz)   LMP 12/12/2024   BMI 29.20 kg/m²     Physical Exam  Vitals and nursing note reviewed. Exam conducted with a chaperone present.   Constitutional:       General: She is not in acute distress.     Appearance: She is well-developed. She is not diaphoretic.   HENT:      Head: Normocephalic and atraumatic.      Nose: Nose normal.   Eyes:      Extraocular Movements: Extraocular movements intact.   Cardiovascular:      Rate and Rhythm: Normal rate.   Pulmonary:      Effort: Pulmonary effort is normal.   Chest:   Breasts:     Breasts are symmetrical.      Right: Normal. No mass, nipple discharge, skin change or tenderness.      Left: Normal. No mass, nipple discharge, skin change or tenderness.   Abdominal:      General: There is no distension.      Palpations: Abdomen is soft. There is no mass.      Tenderness: There is no abdominal tenderness. There is no guarding.   Genitourinary:     General: Normal vulva.      Pubic Area: No rash.       Vagina: Normal. No vaginal discharge.      Cervix: Normal.      Uterus: Normal.       Adnexa: Right adnexa " normal and left adnexa normal.   Musculoskeletal:         General: No tenderness or deformity. Normal range of motion.      Cervical back: Normal range of motion.   Lymphadenopathy:      Upper Body:      Right upper body: No axillary adenopathy.      Left upper body: No axillary adenopathy.   Skin:     General: Skin is warm and dry.      Coloration: Skin is not pale.      Findings: No erythema or rash.   Neurological:      Mental Status: She is alert and oriented to person, place, and time.   Psychiatric:         Behavior: Behavior normal.         Thought Content: Thought content normal.         Judgment: Judgment normal.         Labs:   Lab Results (last 24 hours)       Procedure Component Value Units Date/Time    POC Urinalysis Dipstick [[id number]]  (Normal) Collected: 12/27/24 0859    Specimen: Urine Updated: 12/27/24 0900     Color Yellow     Clarity, UA Clear     Glucose, UA Negative mg/dL      Bilirubin Negative     Ketones, UA Negative     Specific Gravity  1.005     Blood, UA Negative     pH, Urine 5.0     Protein, POC Negative mg/dL      Urobilinogen, UA Normal     Leukocytes Negative     Nitrite, UA Negative                As part of wellness and prevention, the following topics were discussed with the patient where appropriate: healthy weight, substance abuse/misuse, mental health, encouraging self breast exam, and other counseling and guidance done:  Nutrition, physical activity, healthy weight, injury prevention, misuse of tobacco, alcohol and drugs, sexual behavior and STDs, contraception, dental health, mental health, immunizations breast cancer screening and exams.      Mammogram:   Last Completed Mammogram       This patient has no relevant Health Maintenance data.          MAMMOGRAM UP TO DATE IF AGE APPROPRIATE?  No  (if no, pt encouraged to schedule; risks of undiagnosed breast cancer reviewed with pt if she does not have a mammogram)    Assessment/Plan:     1) GYN annual well woman exam.   2)  PAP done today?   3) problems addressed:     * No active hospital problems. *            Follow up prn or one year.    Pt instructed to call for results of any testing done today and that failure to call if she has not heard from us could result in inadequate treatment.  Pt verbalized her understanding.   Diagnoses and all orders for this visit:    1. Routine gynecological examination (Primary)  -     POC Urinalysis Dipstick  -     IGP, Apt HPV,rfx 16 / 18,45    2. Well woman exam    3. Smoking    4. Increased risk of breast cancer  Overview:   lifetime risk per tyrer cuzick model is 20.6% (2/2021)      5. Fibrocystic breast changes, bilateral    6. Breast density    7. Increased body mass index (BMI)    8. FH: breast cancer    9. Screening for human papillomavirus (HPV)  -     IGP, Apt HPV,rfx 16 / 18,45    10. Screening mammogram for breast cancer  -     Mammo Screening Digital Tomosynthesis Bilateral With CAD; Future           RTO Return in about 1 year (around 12/27/2025) for Annual physical.      Cam Mckinney MD  12/27/24  09:09 EST

## 2024-12-27 ENCOUNTER — OFFICE VISIT (OUTPATIENT)
Dept: OBSTETRICS AND GYNECOLOGY | Facility: CLINIC | Age: 39
End: 2024-12-27
Payer: COMMERCIAL

## 2024-12-27 VITALS
SYSTOLIC BLOOD PRESSURE: 126 MMHG | WEIGHT: 180.8 LBS | BODY MASS INDEX: 29.06 KG/M2 | HEIGHT: 66 IN | DIASTOLIC BLOOD PRESSURE: 88 MMHG

## 2024-12-27 DIAGNOSIS — R63.8 INCREASED BODY MASS INDEX (BMI): ICD-10-CM

## 2024-12-27 DIAGNOSIS — Z01.419 ROUTINE GYNECOLOGICAL EXAMINATION: Primary | ICD-10-CM

## 2024-12-27 DIAGNOSIS — Z12.31 SCREENING MAMMOGRAM FOR BREAST CANCER: ICD-10-CM

## 2024-12-27 DIAGNOSIS — R92.30 BREAST DENSITY: ICD-10-CM

## 2024-12-27 DIAGNOSIS — Z11.51 SCREENING FOR HUMAN PAPILLOMAVIRUS (HPV): ICD-10-CM

## 2024-12-27 DIAGNOSIS — Z80.3 FH: BREAST CANCER: ICD-10-CM

## 2024-12-27 DIAGNOSIS — Z01.419 WELL WOMAN EXAM: ICD-10-CM

## 2024-12-27 DIAGNOSIS — N60.12 FIBROCYSTIC BREAST CHANGES, BILATERAL: ICD-10-CM

## 2024-12-27 DIAGNOSIS — Z91.89 INCREASED RISK OF BREAST CANCER: ICD-10-CM

## 2024-12-27 DIAGNOSIS — N60.11 FIBROCYSTIC BREAST CHANGES, BILATERAL: ICD-10-CM

## 2024-12-27 DIAGNOSIS — F17.200 SMOKING: ICD-10-CM

## 2024-12-27 LAB
BILIRUB BLD-MCNC: NEGATIVE MG/DL
CLARITY, POC: CLEAR
COLOR UR: YELLOW
GLUCOSE UR STRIP-MCNC: NEGATIVE MG/DL
KETONES UR QL: NEGATIVE
LEUKOCYTE EST, POC: NEGATIVE
NITRITE UR-MCNC: NEGATIVE MG/ML
PH UR: 5 [PH] (ref 5–8)
PROT UR STRIP-MCNC: NEGATIVE MG/DL
RBC # UR STRIP: NEGATIVE /UL
SP GR UR: 1 (ref 1–1.03)
UROBILINOGEN UR QL: NORMAL

## 2025-01-02 LAB
CYTOLOGIST CVX/VAG CYTO: NORMAL
CYTOLOGY CVX/VAG DOC CYTO: NORMAL
CYTOLOGY CVX/VAG DOC THIN PREP: NORMAL
DX ICD CODE: NORMAL
HPV I/H RISK 4 DNA CVX QL PROBE+SIG AMP: NEGATIVE
Lab: NORMAL
OTHER STN SPEC: NORMAL
STAT OF ADQ CVX/VAG CYTO-IMP: NORMAL

## 2025-02-19 ENCOUNTER — HOSPITAL ENCOUNTER (OUTPATIENT)
Dept: MAMMOGRAPHY | Facility: HOSPITAL | Age: 40
Discharge: HOME | End: 2025-02-19
Admitting: OBSTETRICS & GYNECOLOGY
Payer: COMMERCIAL

## 2025-02-19 DIAGNOSIS — Z12.31 SCREENING MAMMOGRAM FOR BREAST CANCER: ICD-10-CM

## 2025-02-19 PROCEDURE — 77063 BREAST TOMOSYNTHESIS BI: CPT | Performed by: RADIOLOGY

## 2025-02-19 PROCEDURE — 77067 SCR MAMMO BI INCL CAD: CPT

## 2025-02-19 PROCEDURE — 77067 SCR MAMMO BI INCL CAD: CPT | Performed by: RADIOLOGY

## 2025-02-19 PROCEDURE — 77063 BREAST TOMOSYNTHESIS BI: CPT

## 2025-02-20 DIAGNOSIS — R92.8 ABNORMAL MAMMOGRAM: Primary | ICD-10-CM

## 2025-03-20 ENCOUNTER — HOSPITAL ENCOUNTER (OUTPATIENT)
Dept: ULTRASOUND IMAGING | Facility: HOSPITAL | Age: 40
Discharge: HOME | End: 2025-03-20
Payer: COMMERCIAL

## 2025-03-20 ENCOUNTER — HOSPITAL ENCOUNTER (OUTPATIENT)
Dept: MAMMOGRAPHY | Facility: HOSPITAL | Age: 40
Discharge: HOME | End: 2025-03-20
Payer: COMMERCIAL

## 2025-03-20 DIAGNOSIS — R92.8 ABNORMAL MAMMOGRAM: ICD-10-CM

## 2025-03-20 PROCEDURE — G0279 TOMOSYNTHESIS, MAMMO: HCPCS

## 2025-03-20 PROCEDURE — 76642 ULTRASOUND BREAST LIMITED: CPT

## 2025-03-20 PROCEDURE — 77065 DX MAMMO INCL CAD UNI: CPT
